# Patient Record
Sex: FEMALE | Race: WHITE | NOT HISPANIC OR LATINO | URBAN - METROPOLITAN AREA
[De-identification: names, ages, dates, MRNs, and addresses within clinical notes are randomized per-mention and may not be internally consistent; named-entity substitution may affect disease eponyms.]

---

## 2017-01-09 ENCOUNTER — OUTPATIENT (OUTPATIENT)
Dept: OUTPATIENT SERVICES | Facility: HOSPITAL | Age: 57
LOS: 1 days | Discharge: HOME | End: 2017-01-09

## 2017-01-11 DIAGNOSIS — E03.9 HYPOTHYROIDISM, UNSPECIFIED: ICD-10-CM

## 2017-01-11 DIAGNOSIS — L98.7 EXCESSIVE AND REDUNDANT SKIN AND SUBCUTANEOUS TISSUE: ICD-10-CM

## 2017-01-11 DIAGNOSIS — Z88.2 ALLERGY STATUS TO SULFONAMIDES: ICD-10-CM

## 2017-01-14 ENCOUNTER — RX RENEWAL (OUTPATIENT)
Age: 57
End: 2017-01-14

## 2017-07-03 ENCOUNTER — APPOINTMENT (OUTPATIENT)
Dept: INTERNAL MEDICINE | Facility: CLINIC | Age: 57
End: 2017-07-03

## 2018-02-23 ENCOUNTER — APPOINTMENT (OUTPATIENT)
Dept: RHEUMATOLOGY | Facility: CLINIC | Age: 58
End: 2018-02-23
Payer: COMMERCIAL

## 2018-02-23 PROCEDURE — 96374 THER/PROPH/DIAG INJ IV PUSH: CPT

## 2018-07-25 ENCOUNTER — LABORATORY RESULT (OUTPATIENT)
Age: 58
End: 2018-07-25

## 2018-07-25 ENCOUNTER — APPOINTMENT (OUTPATIENT)
Dept: INTERNAL MEDICINE | Facility: CLINIC | Age: 58
End: 2018-07-25
Payer: COMMERCIAL

## 2018-07-25 ENCOUNTER — NON-APPOINTMENT (OUTPATIENT)
Age: 58
End: 2018-07-25

## 2018-07-25 VITALS — HEIGHT: 61 IN | BODY MASS INDEX: 19.07 KG/M2 | WEIGHT: 101 LBS

## 2018-07-25 VITALS — WEIGHT: 101 LBS | BODY MASS INDEX: 19.07 KG/M2 | HEIGHT: 61 IN

## 2018-07-25 VITALS — SYSTOLIC BLOOD PRESSURE: 120 MMHG | DIASTOLIC BLOOD PRESSURE: 70 MMHG

## 2018-07-25 VITALS — SYSTOLIC BLOOD PRESSURE: 120 MMHG | DIASTOLIC BLOOD PRESSURE: 82 MMHG

## 2018-07-25 DIAGNOSIS — R94.5 ABNORMAL RESULTS OF LIVER FUNCTION STUDIES: ICD-10-CM

## 2018-07-25 LAB
BILIRUB UR QL STRIP: NORMAL
CLARITY UR: CLEAR
COLLECTION METHOD: NORMAL
GLUCOSE UR-MCNC: NORMAL
HCG UR QL: 0.2 EU/DL
HGB UR QL STRIP.AUTO: NORMAL
KETONES UR-MCNC: NORMAL
LEUKOCYTE ESTERASE UR QL STRIP: NORMAL
NITRITE UR QL STRIP: NORMAL
PH UR STRIP: 7.5
PROT UR STRIP-MCNC: NORMAL
SP GR UR STRIP: 1.01

## 2018-07-25 PROCEDURE — 99386 PREV VISIT NEW AGE 40-64: CPT | Mod: 25

## 2018-07-25 PROCEDURE — 36415 COLL VENOUS BLD VENIPUNCTURE: CPT

## 2018-07-25 PROCEDURE — 81003 URINALYSIS AUTO W/O SCOPE: CPT | Mod: QW

## 2018-07-25 PROCEDURE — 93000 ELECTROCARDIOGRAM COMPLETE: CPT

## 2018-07-25 NOTE — HEALTH RISK ASSESSMENT
[Very Good] : ~his/her~  mood as very good [No falls in past year] : Patient reported no falls in the past year [0] : 2) Feeling down, depressed, or hopeless: Not at all (0) [None] : None [With Significant Other] : lives with significant other [Employed] : employed [College] : College [] :  [Sexually Active] : sexually active [Feels Safe at Home] : Feels safe at home [Fully functional (bathing, dressing, toileting, transferring, walking, feeding)] : Fully functional (bathing, dressing, toileting, transferring, walking, feeding) [Fully functional (using the telephone, shopping, preparing meals, housekeeping, doing laundry, using] : Fully functional and needs no help or supervision to perform IADLs (using the telephone, shopping, preparing meals, housekeeping, doing laundry, using transportation, managing medications and managing finances) [Smoke Detector] : smoke detector [Carbon Monoxide Detector] : carbon monoxide detector [Seat Belt] :  uses seat belt [Sunscreen] : uses sunscreen [] : No [Change in mental status noted] : No change in mental status noted [Language] : denies difficulty with language [Behavior] : denies difficulty with behavior [Learning/Retaining New Information] : denies difficulty learning/retaining new information [Handling Complex Tasks] : denies difficulty handling complex tasks [Spatial Ability and Orientation] : denies difficulty with spatial ability and orientation [Reports changes in hearing] : Reports no changes in hearing [Reports changes in vision] : Reports no changes in vision [Reports changes in dental health] : Reports no changes in dental health [Guns at Home] : no guns at home [Travel to Developing Areas] : does not  travel to developing areas [TB Exposure] : is not being exposed to tuberculosis [MammogramDate] : 01/2016

## 2018-07-25 NOTE — PHYSICAL EXAM

## 2018-07-25 NOTE — HISTORY OF PRESENT ILLNESS
[FreeTextEntry1] : This is a 57-year-old female for annual health assessment [de-identified] : Specifically we will address her history of Behcet's osteoporosis hypothyroidism

## 2018-07-25 NOTE — ASSESSMENT
[FreeTextEntry1] : This is a 57-year-old\par \par Patient has a history of hypothyroidism she is maintained on Synthroid a TSH has been obtained\par \par She has a history of osteoporosis she is followed by endocrinology and has received intravenous therapy she is due for bone density\par \par She has a distant history of the shunt this was diagnosed clinically of course she has no manifestations at this time\par \par She follows up appropriately for mammography OB/GYN colonoscopy\par \par I have recommended that she take the new shingle shot

## 2018-07-31 ENCOUNTER — OTHER (OUTPATIENT)
Age: 58
End: 2018-07-31

## 2018-08-01 LAB
25(OH)D3 SERPL-MCNC: 62.4 NG/ML
ALBUMIN SERPL ELPH-MCNC: 4.3 G/DL
ALP BLD-CCNC: 70 U/L
ALT SERPL-CCNC: 53 U/L
ANION GAP SERPL CALC-SCNC: 17 MMOL/L
AST SERPL-CCNC: 39 U/L
BASOPHILS # BLD AUTO: 0.02 K/UL
BASOPHILS NFR BLD AUTO: 0.3 %
BILIRUB SERPL-MCNC: 0.4 MG/DL
BUN SERPL-MCNC: 18 MG/DL
CALCIUM SERPL-MCNC: 10 MG/DL
CHLORIDE SERPL-SCNC: 98 MMOL/L
CHOLEST SERPL-MCNC: 161 MG/DL
CHOLEST/HDLC SERPL: 2.3 RATIO
CO2 SERPL-SCNC: 21 MMOL/L
CREAT SERPL-MCNC: 0.45 MG/DL
EOSINOPHIL # BLD AUTO: 0.12 K/UL
EOSINOPHIL NFR BLD AUTO: 1.5 %
GLUCOSE SERPL-MCNC: 114 MG/DL
HBA1C MFR BLD HPLC: 5.5 %
HBV SURFACE AB SER QL: NONREACTIVE
HBV SURFACE AG SER QL: NONREACTIVE
HCT VFR BLD CALC: 42.9 %
HCV AB SER QL: NONREACTIVE
HCV S/CO RATIO: 0.39 S/CO
HDLC SERPL-MCNC: 70 MG/DL
HGB BLD-MCNC: 13.9 G/DL
IMM GRANULOCYTES NFR BLD AUTO: 0.1 %
IRON SATN MFR SERPL: 34 %
IRON SERPL-MCNC: 107 UG/DL
LDLC SERPL CALC-MCNC: 65 MG/DL
LYMPHOCYTES # BLD AUTO: 3.22 K/UL
LYMPHOCYTES NFR BLD AUTO: 41.2 %
MAN DIFF?: NORMAL
MCHC RBC-ENTMCNC: 28 PG
MCHC RBC-ENTMCNC: 32.4 GM/DL
MCV RBC AUTO: 86.3 FL
MONOCYTES # BLD AUTO: 0.6 K/UL
MONOCYTES NFR BLD AUTO: 7.7 %
NEUTROPHILS # BLD AUTO: 3.84 K/UL
NEUTROPHILS NFR BLD AUTO: 49.2 %
PLATELET # BLD AUTO: 229 K/UL
POTASSIUM SERPL-SCNC: 4.5 MMOL/L
PROT SERPL-MCNC: 7.5 G/DL
RBC # BLD: 4.97 M/UL
RBC # FLD: 13.9 %
SAVE SPECIMEN: NORMAL
SODIUM SERPL-SCNC: 136 MMOL/L
T3RU NFR SERPL: 0.89 INDEX
T4 SERPL-MCNC: 7.4 UG/DL
TIBC SERPL-MCNC: 317 UG/DL
TRIGL SERPL-MCNC: 130 MG/DL
TSH SERPL-ACNC: 0.01 UIU/ML
UIBC SERPL-MCNC: 210 UG/DL
URATE SERPL-MCNC: 6.5 MG/DL
WBC # FLD AUTO: 7.81 K/UL

## 2019-01-19 ENCOUNTER — TRANSCRIPTION ENCOUNTER (OUTPATIENT)
Age: 59
End: 2019-01-19

## 2019-02-27 ENCOUNTER — APPOINTMENT (OUTPATIENT)
Dept: RHEUMATOLOGY | Facility: CLINIC | Age: 59
End: 2019-02-27
Payer: COMMERCIAL

## 2019-02-27 PROCEDURE — 96374 THER/PROPH/DIAG INJ IV PUSH: CPT

## 2019-10-18 ENCOUNTER — TRANSCRIPTION ENCOUNTER (OUTPATIENT)
Age: 59
End: 2019-10-18

## 2019-10-25 ENCOUNTER — APPOINTMENT (OUTPATIENT)
Dept: INTERNAL MEDICINE | Facility: CLINIC | Age: 59
End: 2019-10-25
Payer: COMMERCIAL

## 2019-10-25 VITALS — WEIGHT: 101 LBS | BODY MASS INDEX: 19.07 KG/M2 | HEIGHT: 61 IN

## 2019-10-25 DIAGNOSIS — L02.91 CUTANEOUS ABSCESS, UNSPECIFIED: ICD-10-CM

## 2019-10-25 DIAGNOSIS — Z00.00 ENCOUNTER FOR GENERAL ADULT MEDICAL EXAMINATION W/OUT ABNORMAL FINDINGS: ICD-10-CM

## 2019-10-25 PROCEDURE — 99213 OFFICE O/P EST LOW 20 MIN: CPT

## 2019-10-25 NOTE — PHYSICAL EXAM
[No Lymphadenopathy] : no lymphadenopathy [Supple] : supple [Normal] : no jugular venous distention, supple, no lymphadenopathy and the thyroid was normal and there were no nodules present [de-identified] : very small pustule approximately 1/4 cm minimally tender and draining

## 2019-10-25 NOTE — ASSESSMENT
[FreeTextEntry1] : This is a 59-year-old female who comes in for history which was reviewed above\par \par Her pustule has been draining for 4 days she did not come in because she was waiting for the culture. She has no systemic symptoms he has minimal tenderness only on pressure of this area\par \par She was asked to call her plastic surgeon this afternoon. She should make an appointment as soon as possible however I do not believe that this is significant she will continue with her current regime of  compresses and bacitracin. I told her that if there is any evidence of spread or increase or fevers to call me. In addition I told her if her plastic surgeon had any suggestions this afternoon I be more than happy to listen

## 2019-10-25 NOTE — HISTORY OF PRESENT ILLNESS
[FreeTextEntry8] : This is a 59-year-old female who has had a bout of MRSA in the past\par \par .Approximately a month ago she had some plastic surgery unfortunately this keloids . In addition she had some suture dehiscence she was seen by plastics and noted to have an infection. No antibiotics were given\par \par She did develop a keloid she saw her plastic surgeon who started giving her steroid injections approximately one week ago this became erythematous and now she has a small area of pus \par \par This has been oozing for approximately 4 days she went for a culture the culture came back positive for staph resistant to penicillin but sensitive to oxacillin and ampicillin she is allergic to ampicillin she feels that it is getting better

## 2019-10-25 NOTE — PHYSICAL EXAM
[No Lymphadenopathy] : no lymphadenopathy [Normal] : no jugular venous distention, supple, no lymphadenopathy and the thyroid was normal and there were no nodules present [Supple] : supple [de-identified] : very small pustule approximately 1/4 cm minimally tender and draining

## 2019-12-20 ENCOUNTER — APPOINTMENT (OUTPATIENT)
Dept: INTERNAL MEDICINE | Facility: CLINIC | Age: 59
End: 2019-12-20

## 2020-02-19 ENCOUNTER — APPOINTMENT (OUTPATIENT)
Dept: INTERNAL MEDICINE | Facility: CLINIC | Age: 60
End: 2020-02-19

## 2020-02-26 ENCOUNTER — APPOINTMENT (OUTPATIENT)
Dept: INTERNAL MEDICINE | Facility: CLINIC | Age: 60
End: 2020-02-26
Payer: COMMERCIAL

## 2020-02-26 PROCEDURE — 90471 IMMUNIZATION ADMIN: CPT

## 2020-02-26 PROCEDURE — 90750 HZV VACC RECOMBINANT IM: CPT

## 2020-03-20 ENCOUNTER — APPOINTMENT (OUTPATIENT)
Dept: RHEUMATOLOGY | Facility: CLINIC | Age: 60
End: 2020-03-20
Payer: COMMERCIAL

## 2020-03-20 PROCEDURE — 96374 THER/PROPH/DIAG INJ IV PUSH: CPT

## 2020-06-02 DIAGNOSIS — Z11.59 ENCOUNTER FOR SCREENING FOR OTHER VIRAL DISEASES: ICD-10-CM

## 2020-06-03 LAB
SARS-COV-2 IGG SERPL IA-ACNC: <0.1 INDEX
SARS-COV-2 IGG SERPL QL IA: NEGATIVE

## 2020-06-12 DIAGNOSIS — R92.2 INCONCLUSIVE MAMMOGRAM: ICD-10-CM

## 2020-06-12 DIAGNOSIS — Z12.31 ENCOUNTER FOR SCREENING MAMMOGRAM FOR MALIGNANT NEOPLASM OF BREAST: ICD-10-CM

## 2020-07-08 ENCOUNTER — APPOINTMENT (OUTPATIENT)
Dept: INTERNAL MEDICINE | Facility: CLINIC | Age: 60
End: 2020-07-08
Payer: COMMERCIAL

## 2020-07-08 DIAGNOSIS — Z23 ENCOUNTER FOR IMMUNIZATION: ICD-10-CM

## 2020-07-08 PROCEDURE — 90471 IMMUNIZATION ADMIN: CPT

## 2020-07-08 PROCEDURE — 90750 HZV VACC RECOMBINANT IM: CPT

## 2021-03-29 ENCOUNTER — APPOINTMENT (OUTPATIENT)
Dept: RHEUMATOLOGY | Facility: CLINIC | Age: 61
End: 2021-03-29
Payer: COMMERCIAL

## 2021-03-29 PROCEDURE — 99072 ADDL SUPL MATRL&STAF TM PHE: CPT

## 2021-03-29 PROCEDURE — 96374 THER/PROPH/DIAG INJ IV PUSH: CPT

## 2021-10-10 ENCOUNTER — TRANSCRIPTION ENCOUNTER (OUTPATIENT)
Age: 61
End: 2021-10-10

## 2021-10-20 ENCOUNTER — INPATIENT (INPATIENT)
Facility: HOSPITAL | Age: 61
LOS: 0 days | Discharge: HOME | DRG: 274 | End: 2021-10-21
Attending: INTERNAL MEDICINE | Admitting: INTERNAL MEDICINE
Payer: COMMERCIAL

## 2021-10-20 ENCOUNTER — APPOINTMENT (OUTPATIENT)
Dept: INTERNAL MEDICINE | Facility: CLINIC | Age: 61
End: 2021-10-20
Payer: COMMERCIAL

## 2021-10-20 ENCOUNTER — FORM ENCOUNTER (OUTPATIENT)
Age: 61
End: 2021-10-20

## 2021-10-20 ENCOUNTER — NON-APPOINTMENT (OUTPATIENT)
Age: 61
End: 2021-10-20

## 2021-10-20 VITALS — OXYGEN SATURATION: 97 % | TEMPERATURE: 97.7 F

## 2021-10-20 VITALS
OXYGEN SATURATION: 95 % | RESPIRATION RATE: 17 BRPM | DIASTOLIC BLOOD PRESSURE: 108 MMHG | HEART RATE: 157 BPM | TEMPERATURE: 98 F | SYSTOLIC BLOOD PRESSURE: 155 MMHG

## 2021-10-20 VITALS
SYSTOLIC BLOOD PRESSURE: 120 MMHG | DIASTOLIC BLOOD PRESSURE: 70 MMHG | BODY MASS INDEX: 19.07 KG/M2 | WEIGHT: 101 LBS | HEIGHT: 61 IN

## 2021-10-20 DIAGNOSIS — I48.91 UNSPECIFIED ATRIAL FIBRILLATION: ICD-10-CM

## 2021-10-20 LAB
ALBUMIN SERPL ELPH-MCNC: 3.7 G/DL — SIGNIFICANT CHANGE UP (ref 3.3–5)
ALBUMIN SERPL ELPH-MCNC: 4.2 G/DL — SIGNIFICANT CHANGE UP (ref 3.3–5)
ALP SERPL-CCNC: 68 U/L — SIGNIFICANT CHANGE UP (ref 40–120)
ALP SERPL-CCNC: 73 U/L — SIGNIFICANT CHANGE UP (ref 40–120)
ALT FLD-CCNC: 32 U/L — SIGNIFICANT CHANGE UP (ref 10–45)
ALT FLD-CCNC: 38 U/L — SIGNIFICANT CHANGE UP (ref 10–45)
ANION GAP SERPL CALC-SCNC: 14 MMOL/L — SIGNIFICANT CHANGE UP (ref 5–17)
ANION GAP SERPL CALC-SCNC: 16 MMOL/L — SIGNIFICANT CHANGE UP (ref 5–17)
APTT BLD: 30.1 SEC — SIGNIFICANT CHANGE UP (ref 27.5–35.5)
APTT BLD: 80.8 SEC — HIGH (ref 27.5–35.5)
AST SERPL-CCNC: 23 U/L — SIGNIFICANT CHANGE UP (ref 10–40)
AST SERPL-CCNC: 28 U/L — SIGNIFICANT CHANGE UP (ref 10–40)
BASOPHILS # BLD AUTO: 0.02 K/UL — SIGNIFICANT CHANGE UP (ref 0–0.2)
BASOPHILS NFR BLD AUTO: 0.3 % — SIGNIFICANT CHANGE UP (ref 0–2)
BILIRUB SERPL-MCNC: 0.3 MG/DL — SIGNIFICANT CHANGE UP (ref 0.2–1.2)
BILIRUB SERPL-MCNC: 0.3 MG/DL — SIGNIFICANT CHANGE UP (ref 0.2–1.2)
BUN SERPL-MCNC: 10 MG/DL — SIGNIFICANT CHANGE UP (ref 7–23)
BUN SERPL-MCNC: 12 MG/DL — SIGNIFICANT CHANGE UP (ref 7–23)
CALCIUM SERPL-MCNC: 8.9 MG/DL — SIGNIFICANT CHANGE UP (ref 8.4–10.5)
CALCIUM SERPL-MCNC: 9.6 MG/DL — SIGNIFICANT CHANGE UP (ref 8.4–10.5)
CHLORIDE SERPL-SCNC: 104 MMOL/L — SIGNIFICANT CHANGE UP (ref 96–108)
CHLORIDE SERPL-SCNC: 108 MMOL/L — SIGNIFICANT CHANGE UP (ref 96–108)
CO2 SERPL-SCNC: 17 MMOL/L — LOW (ref 22–31)
CO2 SERPL-SCNC: 17 MMOL/L — LOW (ref 22–31)
CREAT SERPL-MCNC: 0.53 MG/DL — SIGNIFICANT CHANGE UP (ref 0.5–1.3)
CREAT SERPL-MCNC: <0.3 MG/DL — LOW (ref 0.5–1.3)
D DIMER BLD IA.RAPID-MCNC: <150 NG/ML DDU — SIGNIFICANT CHANGE UP
EOSINOPHIL # BLD AUTO: 0.04 K/UL — SIGNIFICANT CHANGE UP (ref 0–0.5)
EOSINOPHIL NFR BLD AUTO: 0.6 % — SIGNIFICANT CHANGE UP (ref 0–6)
ETHANOL SERPL-MCNC: SIGNIFICANT CHANGE UP MG/DL (ref 0–10)
GLUCOSE SERPL-MCNC: 106 MG/DL — HIGH (ref 70–99)
GLUCOSE SERPL-MCNC: 108 MG/DL — HIGH (ref 70–99)
HCT VFR BLD CALC: 41.7 % — SIGNIFICANT CHANGE UP (ref 34.5–45)
HGB BLD-MCNC: 13.6 G/DL — SIGNIFICANT CHANGE UP (ref 11.5–15.5)
IMM GRANULOCYTES NFR BLD AUTO: 0.3 % — SIGNIFICANT CHANGE UP (ref 0–1.5)
INR BLD: 0.9 RATIO — SIGNIFICANT CHANGE UP (ref 0.88–1.16)
INR BLD: 0.91 RATIO — SIGNIFICANT CHANGE UP (ref 0.88–1.16)
LACTATE SERPL-SCNC: 1.6 MMOL/L — SIGNIFICANT CHANGE UP (ref 0.7–2)
LYMPHOCYTES # BLD AUTO: 2.14 K/UL — SIGNIFICANT CHANGE UP (ref 1–3.3)
LYMPHOCYTES # BLD AUTO: 29.6 % — SIGNIFICANT CHANGE UP (ref 13–44)
MAGNESIUM SERPL-MCNC: 1.9 MG/DL — SIGNIFICANT CHANGE UP (ref 1.6–2.6)
MAGNESIUM SERPL-MCNC: 1.9 MG/DL — SIGNIFICANT CHANGE UP (ref 1.6–2.6)
MCHC RBC-ENTMCNC: 27.3 PG — SIGNIFICANT CHANGE UP (ref 27–34)
MCHC RBC-ENTMCNC: 32.6 GM/DL — SIGNIFICANT CHANGE UP (ref 32–36)
MCV RBC AUTO: 83.6 FL — SIGNIFICANT CHANGE UP (ref 80–100)
MONOCYTES # BLD AUTO: 0.9 K/UL — SIGNIFICANT CHANGE UP (ref 0–0.9)
MONOCYTES NFR BLD AUTO: 12.5 % — SIGNIFICANT CHANGE UP (ref 2–14)
NEUTROPHILS # BLD AUTO: 4.1 K/UL — SIGNIFICANT CHANGE UP (ref 1.8–7.4)
NEUTROPHILS NFR BLD AUTO: 56.7 % — SIGNIFICANT CHANGE UP (ref 43–77)
NRBC # BLD: 0 /100 WBCS — SIGNIFICANT CHANGE UP (ref 0–0)
NT-PROBNP SERPL-SCNC: 815 PG/ML — HIGH (ref 0–300)
PHOSPHATE SERPL-MCNC: 1.9 MG/DL — LOW (ref 2.5–4.5)
PLATELET # BLD AUTO: 175 K/UL — SIGNIFICANT CHANGE UP (ref 150–400)
POTASSIUM SERPL-MCNC: 4.1 MMOL/L — SIGNIFICANT CHANGE UP (ref 3.5–5.3)
POTASSIUM SERPL-MCNC: 4.3 MMOL/L — SIGNIFICANT CHANGE UP (ref 3.5–5.3)
POTASSIUM SERPL-SCNC: 4.1 MMOL/L — SIGNIFICANT CHANGE UP (ref 3.5–5.3)
POTASSIUM SERPL-SCNC: 4.3 MMOL/L — SIGNIFICANT CHANGE UP (ref 3.5–5.3)
PROT SERPL-MCNC: 6.8 G/DL — SIGNIFICANT CHANGE UP (ref 6–8.3)
PROT SERPL-MCNC: 7.3 G/DL — SIGNIFICANT CHANGE UP (ref 6–8.3)
PROTHROM AB SERPL-ACNC: 10.9 SEC — SIGNIFICANT CHANGE UP (ref 10.6–13.6)
PROTHROM AB SERPL-ACNC: 11 SEC — SIGNIFICANT CHANGE UP (ref 10.6–13.6)
RAPID RVP RESULT: SIGNIFICANT CHANGE UP
RBC # BLD: 4.99 M/UL — SIGNIFICANT CHANGE UP (ref 3.8–5.2)
RBC # FLD: 12.7 % — SIGNIFICANT CHANGE UP (ref 10.3–14.5)
SARS-COV-2 RNA SPEC QL NAA+PROBE: SIGNIFICANT CHANGE UP
SODIUM SERPL-SCNC: 137 MMOL/L — SIGNIFICANT CHANGE UP (ref 135–145)
SODIUM SERPL-SCNC: 139 MMOL/L — SIGNIFICANT CHANGE UP (ref 135–145)
TROPONIN T, HIGH SENSITIVITY RESULT: <6 NG/L — SIGNIFICANT CHANGE UP (ref 0–51)
WBC # BLD: 7.22 K/UL — SIGNIFICANT CHANGE UP (ref 3.8–10.5)
WBC # FLD AUTO: 7.22 K/UL — SIGNIFICANT CHANGE UP (ref 3.8–10.5)

## 2021-10-20 PROCEDURE — 71046 X-RAY EXAM CHEST 2 VIEWS: CPT | Mod: 26

## 2021-10-20 PROCEDURE — 93000 ELECTROCARDIOGRAM COMPLETE: CPT

## 2021-10-20 PROCEDURE — 99221 1ST HOSP IP/OBS SF/LOW 40: CPT

## 2021-10-20 PROCEDURE — 99291 CRITICAL CARE FIRST HOUR: CPT | Mod: 25

## 2021-10-20 PROCEDURE — 93010 ELECTROCARDIOGRAM REPORT: CPT | Mod: 59

## 2021-10-20 PROCEDURE — 99214 OFFICE O/P EST MOD 30 MIN: CPT | Mod: 25

## 2021-10-20 PROCEDURE — 71275 CT ANGIOGRAPHY CHEST: CPT | Mod: 26,MA

## 2021-10-20 RX ORDER — LIOTHYRONINE SODIUM 25 UG/1
1 TABLET ORAL
Qty: 0 | Refills: 0 | DISCHARGE

## 2021-10-20 RX ORDER — FOLIC ACID 0.8 MG
1 TABLET ORAL DAILY
Refills: 0 | Status: DISCONTINUED | OUTPATIENT
Start: 2021-10-20 | End: 2021-10-21

## 2021-10-20 RX ORDER — CHOLECALCIFEROL (VITAMIN D3) 125 MCG
0 CAPSULE ORAL
Qty: 0 | Refills: 0 | DISCHARGE

## 2021-10-20 RX ORDER — CALCIUM CARBONATE 500(1250)
0 TABLET ORAL
Qty: 0 | Refills: 0 | DISCHARGE

## 2021-10-20 RX ORDER — HEPARIN SODIUM 5000 [USP'U]/ML
INJECTION INTRAVENOUS; SUBCUTANEOUS
Qty: 25000 | Refills: 0 | Status: DISCONTINUED | OUTPATIENT
Start: 2021-10-20 | End: 2021-10-20

## 2021-10-20 RX ORDER — MAGNESIUM SULFATE 500 MG/ML
1 VIAL (ML) INJECTION ONCE
Refills: 0 | Status: COMPLETED | OUTPATIENT
Start: 2021-10-20 | End: 2021-10-20

## 2021-10-20 RX ORDER — DILTIAZEM HCL 120 MG
60 CAPSULE, EXT RELEASE 24 HR ORAL ONCE
Refills: 0 | Status: DISCONTINUED | OUTPATIENT
Start: 2021-10-20 | End: 2021-10-20

## 2021-10-20 RX ORDER — DILTIAZEM HCL 120 MG
12 CAPSULE, EXT RELEASE 24 HR ORAL ONCE
Refills: 0 | Status: COMPLETED | OUTPATIENT
Start: 2021-10-20 | End: 2021-10-20

## 2021-10-20 RX ORDER — HEPARIN SODIUM 5000 [USP'U]/ML
2000 INJECTION INTRAVENOUS; SUBCUTANEOUS EVERY 6 HOURS
Refills: 0 | Status: DISCONTINUED | OUTPATIENT
Start: 2021-10-20 | End: 2021-10-20

## 2021-10-20 RX ORDER — HEPARIN SODIUM 5000 [USP'U]/ML
4000 INJECTION INTRAVENOUS; SUBCUTANEOUS ONCE
Refills: 0 | Status: COMPLETED | OUTPATIENT
Start: 2021-10-20 | End: 2021-10-20

## 2021-10-20 RX ORDER — LEVOTHYROXINE SODIUM 125 MCG
1 TABLET ORAL
Qty: 0 | Refills: 0 | DISCHARGE

## 2021-10-20 RX ORDER — HEPARIN SODIUM 5000 [USP'U]/ML
900 INJECTION INTRAVENOUS; SUBCUTANEOUS
Qty: 25000 | Refills: 0 | Status: DISCONTINUED | OUTPATIENT
Start: 2021-10-20 | End: 2021-10-21

## 2021-10-20 RX ORDER — SPIRONOLACTONE 25 MG/1
1 TABLET, FILM COATED ORAL
Qty: 0 | Refills: 0 | DISCHARGE

## 2021-10-20 RX ORDER — DILTIAZEM HCL 120 MG
10 CAPSULE, EXT RELEASE 24 HR ORAL ONCE
Refills: 0 | Status: DISCONTINUED | OUTPATIENT
Start: 2021-10-20 | End: 2021-10-20

## 2021-10-20 RX ORDER — CHLORHEXIDINE GLUCONATE 213 G/1000ML
1 SOLUTION TOPICAL DAILY
Refills: 0 | Status: DISCONTINUED | OUTPATIENT
Start: 2021-10-20 | End: 2021-10-21

## 2021-10-20 RX ORDER — SODIUM CHLORIDE 9 MG/ML
1000 INJECTION INTRAMUSCULAR; INTRAVENOUS; SUBCUTANEOUS ONCE
Refills: 0 | Status: COMPLETED | OUTPATIENT
Start: 2021-10-20 | End: 2021-10-20

## 2021-10-20 RX ORDER — FINASTERIDE 5 MG/1
1 TABLET, FILM COATED ORAL
Qty: 0 | Refills: 0 | DISCHARGE

## 2021-10-20 RX ORDER — INFLUENZA VIRUS VACCINE 15; 15; 15; 15 UG/.5ML; UG/.5ML; UG/.5ML; UG/.5ML
0.5 SUSPENSION INTRAMUSCULAR ONCE
Refills: 0 | Status: COMPLETED | OUTPATIENT
Start: 2021-10-20 | End: 2021-10-20

## 2021-10-20 RX ORDER — HEPARIN SODIUM 5000 [USP'U]/ML
4000 INJECTION INTRAVENOUS; SUBCUTANEOUS EVERY 6 HOURS
Refills: 0 | Status: DISCONTINUED | OUTPATIENT
Start: 2021-10-20 | End: 2021-10-20

## 2021-10-20 RX ORDER — THIAMINE MONONITRATE (VIT B1) 100 MG
100 TABLET ORAL DAILY
Refills: 0 | Status: DISCONTINUED | OUTPATIENT
Start: 2021-10-20 | End: 2021-10-21

## 2021-10-20 RX ORDER — MAGNESIUM SULFATE 500 MG/ML
2 VIAL (ML) INJECTION ONCE
Refills: 0 | Status: COMPLETED | OUTPATIENT
Start: 2021-10-20 | End: 2021-10-21

## 2021-10-20 RX ORDER — ALPRAZOLAM 0.25 MG
0.25 TABLET ORAL AT BEDTIME
Refills: 0 | Status: DISCONTINUED | OUTPATIENT
Start: 2021-10-20 | End: 2021-10-21

## 2021-10-20 RX ADMIN — SODIUM CHLORIDE 1000 MILLILITER(S): 9 INJECTION INTRAMUSCULAR; INTRAVENOUS; SUBCUTANEOUS at 12:53

## 2021-10-20 RX ADMIN — Medication 1 MILLIGRAM(S): at 22:30

## 2021-10-20 RX ADMIN — Medication 1 TABLET(S): at 22:30

## 2021-10-20 RX ADMIN — Medication 100 GRAM(S): at 16:08

## 2021-10-20 RX ADMIN — SODIUM CHLORIDE 1000 MILLILITER(S): 9 INJECTION INTRAMUSCULAR; INTRAVENOUS; SUBCUTANEOUS at 14:10

## 2021-10-20 RX ADMIN — Medication 0.25 MILLIGRAM(S): at 22:30

## 2021-10-20 RX ADMIN — HEPARIN SODIUM 900 UNIT(S)/HR: 5000 INJECTION INTRAVENOUS; SUBCUTANEOUS at 18:06

## 2021-10-20 RX ADMIN — HEPARIN SODIUM 4000 UNIT(S): 5000 INJECTION INTRAVENOUS; SUBCUTANEOUS at 18:05

## 2021-10-20 RX ADMIN — Medication 12 MILLIGRAM(S): at 13:59

## 2021-10-20 RX ADMIN — Medication 100 MILLIGRAM(S): at 22:30

## 2021-10-20 NOTE — H&P ADULT - ASSESSMENT
60 year old F with PMHx of hypothyroid, Behcet's disease who presented to the ED with chief complaint of lightheadedness and palpitations, found to be in AFib with RVR with conversion pauses after receiving diltiazem, transferred to CICU now pending ablation vs PPM.     #Cardiovascular  Tachy-min syndrome - AFib RVR and Conversion Pauses  - will place TVP  - rate control with diltiazem  - scheduled for PM placement tomorrow, +/- ablation  - EP following    #Neuro  AOx3, no acute issues    #Respiratory  Hx Behcet disease  - Pulmonary CTA to eval for PE  - Sating well RA, goal SpO2 > 92%    #GI/Nutrition  - NPO after MN    #/Renal  - No acute issues  - Trend I/Os, monitor UOP    #Hem-Onc  - heparin gtt    #Endo  No acute issues     #ID  No acute issues  - Trend WBC, fevers    Disposition: CICU           60 year old F with PMHx of hypothyroid, Behcet's disease who presented to the ED with chief complaint of lightheadedness and palpitations, found to be in AFib with RVR with conversion pauses after receiving diltiazem, transferred to CICU now pending ablation vs PPM.     #Cardiovascular  Afib w/ RVR and conversion pauses/sinus pauses.   - unclear etiology: possibly from EtOH use. Will check TSH and electrolytes.  - s/p IV diltiazem in ED with cardioversion to NSR accompanied by conversion pause of 6 seconds. Having sinus pauses 4s-7s afterwards intermittently with symptoms.  - Discussed with Dr. Morgan (EP): hold off on transvenous pacemaker or rate control meds overnight.  - will monitor patient on tele in CCU.   - Transcutaneous pacer pads on patient.  - likely EPS and Afib ablation in the AM. NPO after MN, COVID, pre-op labs.  - EP following.    #Neuro  AOx3, no acute issues  Patient w/ EtOH use. Will send Utox and EtoH level.   PRN benzo.    #Respiratory  Hx Behcet disease  - Pulmonary CTA to eval for PE  - Sating well RA, goal SpO2 > 92%    #GI/Nutrition  - no active issue.  - NPO after MN    #/Renal  - No acute issues  - Trend I/Os, monitor UOP    #Hem-Onc  - heparin gtt    #Endo  No acute issues     #ID  No acute issues  - Trend WBC, fevers    Disposition: CICU           60 year old F with PMHx of hypothyroid, Behcet's disease who presented to the ED with chief complaint of lightheadedness and palpitations, found to be in AFib with RVR with conversion pauses after receiving diltiazem, transferred to CICU now pending ablation vs PPM.     #Cardiovascular  Afib w/ RVR and conversion pauses/sinus pauses.   - unclear etiology: possibly from EtOH use. Will check TSH and electrolytes.  - s/p IV diltiazem in ED with cardioversion to NSR accompanied by conversion pause of 6 seconds. Having sinus pauses 4s-7s afterwards intermittently with symptoms.  - Discussed with Dr. Morgan (EP): hold off on transvenous pacemaker or rate control meds overnight.  - will monitor patient on tele in CCU.   - gentle mIVF.  - Transcutaneous pacer pads on patient.  - likely EPS and Afib ablation in the AM. NPO after MN, COVID, pre-op labs.  - EP following.    #Neuro  AOx3, no acute issues  Patient w/ EtOH use. Will send Utox and EtoH level.   Folic acid, thiamine, MVI.  PRN benzo.    #Respiratory  Hx Behcet disease  - Pulmonary CTA to eval for PE  - Sating well RA, goal SpO2 > 92%    #GI/Nutrition  - no active issue.  - NPO after MN    #/Renal  - No acute issues  - Trend I/Os, monitor UOP    #Hem-Onc  - heparin gtt    #Endo  No acute issues     #ID  No acute issues  - Trend WBC, fevers    Disposition: CICU

## 2021-10-20 NOTE — PHYSICAL EXAM
[No JVD] : no jugular venous distention [Normal] : no joint swelling and grossly normal strength and tone [de-identified] : Irregularly irregular

## 2021-10-20 NOTE — H&P ADULT - NSHPPHYSICALEXAM_GEN_ALL_CORE
Gen: NAD; well appearing  Head: NCAT  Eyes: EOMI, PERRLA, no conjunctival pallor, no scleral icterus  ENT: mucous membranes moist, no discharge  Neck: neck supple  Resp: CTAB, no W/R/R  CV: tachycardic, +S1/S2, no M/R/G  GI: Abdomen soft non-distended, NTTP, no masses  MSK: No open wounds, no bruising, no lower extremity edema  Neuro: A&Ox4, sensation nl, motor 5/5 RUE/LUE/RLE/LLE, follows commands  Ext: no edema, no deformity, warm and well-perfused  Skin: no rash or bruising

## 2021-10-20 NOTE — H&P ADULT - NSHPLABSRESULTS_GEN_ALL_CORE
LABS:                          13.6   7.22  )-----------( 175      ( 20 Oct 2021 12:42 )             41.7     10-20    137  |  104  |  10  ----------------------------<  108<H>  4.3   |  17<L>  |  <0.30<L>    Ca    9.6      20 Oct 2021 12:42  Mg     1.9     10-20    TPro  7.3  /  Alb  4.2  /  TBili  0.3  /  DBili  x   /  AST  28  /  ALT  38  /  AlkPhos  73  10-20    PT/INR - ( 20 Oct 2021 12:42 )   PT: 10.9 sec;   INR: 0.90 ratio         PTT - ( 20 Oct 2021 12:42 )  PTT:30.1 sec

## 2021-10-20 NOTE — ED PROVIDER NOTE - OBJECTIVE STATEMENT
61 yo female with PMHx of hypothyroid, Behcet's disease p/w lightheadedness and palpitations.  Patient reports that she woke up this morning feeling well.  A few hours later she developed lightheadedness and 2 episodes of dizziness associated with palpitations.  Patient was seen by her PMD and found to be in afib w/ rvr and sent to the ER.  Patient reports that she had an episode of afib 1-2 yrs ago.  Initially started on Metoprolol and Eliquis, but that was stopped 2 weeks later by her cardiologist in [facility] after spontaneously converting to NSR.  Afib at that time was believed to be cause by a recent viral infection.  Denies Cp, SOB, abd pain, NVD, dysuria.

## 2021-10-20 NOTE — PATIENT PROFILE ADULT - PUBLIC BENEFITS
Refill request received for     lovastatin (MEVACOR) 40 MG tablet  Last office visit: 1/13/2020  Next office visit: Visit date not found  Last refill: 1.15.2020  Last labs: 12.27.2019, recheck 6/27/2020    [place] Patient asking provider without labs.   Reminded patient  is relocating to [place] mid August 2020 and she will need to establish with a new PCP. Patient verbalized understanding.          no

## 2021-10-20 NOTE — H&P ADULT - NSHPREVIEWOFSYSTEMS_GEN_ALL_CORE
GENERAL:  No fever or chills  EYES: No change in vision  HEENT: No trouble swallowing or speaking  CARDIAC: +palpitations, No chest pain  PULMONARY: No cough or SOB  GI: No abdominal pain, no nausea or no vomiting, no diarrhea or constipation  : No changes in urination  SKIN: No rashes  NEURO: +lightheaded, No headache, no numbness  MSK: No joint pain    Otherwise as HPI or negative.

## 2021-10-20 NOTE — ED PROVIDER NOTE - ENMT NEGATIVE STATEMENT, MLM
--------------- APPROVED REPORT --------------





EXAM: Two-dimensional and M-mode echocardiogram with Doppler and 

color Doppler.



INDICATION

CVA/TIA Syncope 



2D DIMENSIONS 

Left Atrium (2D)4.7   (1.6-4.0cm)IVSd1.3   (0.7-1.1cm)

LVDd4.9   (3.9-5.9cm)PWd1.2   (0.7-1.1cm)

LVDs3.8   (2.5-4.0cm)FS (%) 24.1   %

LVEF (%)47.8   (>50%)



M-Mode DIMENSIONS 

Aortic Root2.70   (2.2-3.7cm)Aortic Cusp Exc.1.70   (1.5-2.0cm)



Aortic Valve

AoV Peak Zpbqyyon703.0cm/[place] Peak GR.7mmHg



Mitral Valve

E/A ratio0.0



TDI

E/Lateral E'0.0E/Medial E'0.0



Tricuspid Valve

TR Peak [id number]/sRAP [id number] Peak Gr.25mmHg

[id number]



 LEFT VENTRICLE 

The left ventricle is normal size. There is mild concentric left 

ventricular hypertrophy. The systolic function is mildly impaired. 

There is global hypokinesis of the left ventricle.



 RIGHT VENTRICLE 

The right ventricle is normal size. There is normal right ventricular 

wall thickness. The right ventricular systolic function is normal.



 ATRIA 

The left atrium is severely dilated. The right atrium is mildly 

dilated.



 AORTIC VALVE 

The aortic valve is not well visualized and it may be bicusped There 

is trace aortic regurgitation.



 MITRAL VALVE 

The mitral valve is mildly thickened. Mitral regurgitation is mild.



 TRICUSPID VALVE 

There is mild pulmonary hypertension.



 GREAT VESSELS 

The aortic root is normal in size.



<Conclusion>

The left ventricle is normal size.

There is mild concentric left ventricular hypertrophy.

The systolic function is mildly impaired.

There is global hypokinesis of the left ventricle.

The left atrium is severely dilated.

The aortic valve is not well visualized and it may be bicusped

There is mild pulmonary hypertension.

Mitral regurgitation is mild. Ears: no ear pain and no hearing problems. Nose: no nasal congestion and no nasal drainage. Mouth/Throat: no dysphagia, no hoarseness and no throat pain. Neck: no lumps, no pain, no stiffness and no swollen glands.

## 2021-10-20 NOTE — ED PROVIDER NOTE - PROGRESS NOTE DETAILS
s/p 12 mg of diltiazem HR in the 120s-130s. phone call from Dr. Smith, recommending abrudescu for admissions Patient with a 4 and 6 second pause on tele without conversion.  EP consulted.  Will see patient.  -Polo Sweet PA-C HARI Mcwilliams, Attending: pt now with 4-5 documented pauses ranging from 4-6 seconds. Otherwise in afib in 140s-150s. BP reassuring. Had single dose of dilt but given pauses will appreciate EP recs for further arrythmia mgmt. Mag ordered. Tele monitoring in place. EP was called. Will see shortly. HARI Mcwilliams, Attending: accepted by CCU for likely PPM. CTA on way up.

## 2021-10-20 NOTE — CONSULT NOTE ADULT - ASSESSMENT
60 year old F with PMHx of hypothyroid, Behcet's disease, COVID-19 presents to the ED with chief complaint of lightheadedness and palpitations. She woke up in the morning feeling well, then developed lightheadedness shortly after. She endorses two episodes of dizziness associated with palpitations, which prompted her visit to see her PCP. Found to be in AFib with RVR and was sent to the ER.  Of note, the patient had a similar episode of Afib 1-2 years go after having COVID. She was started on Metoprolol and Eliquis which was d/c'ed 2 weeks later after self-conversion to NSR. Denies CP, SOB, abd pain, NVD, dysuria. In ED, given 12mg diltiazem IV with improvement in rates to 120-130s. Shortly after, noted to have 4-5 pauses on telemetry each with a duration of 4-6 seconds. EP consulted for management.     1. AFib w/ RVR  2. Pauses    - Monitor on telemetry  - Obtain CICU consult for TVP placement  - Once TVP placed, begin rate control strategy with beta blocker, uptitrate as BP/HR can tolerate.   - Would recommend starting Anticoagulation for AC.  - TSH and TTE ordered, will follow-up.   - Keep K>4.0 and Mg>2.0, replete as necessary.  - Discussed with ED team and EP attending.       ULYSSES Garza PA-C  [phone] 60 year old F with PMHx of hypothyroid, Behcet's disease, COVID-19 presents to the ED with chief complaint of lightheadedness and palpitations. She woke up in the morning feeling well, then developed lightheadedness shortly after. She endorses two episodes of dizziness associated with palpitations, which prompted her visit to see her PCP. Found to be in AFib with RVR and was sent to the ER.  Of note, the patient had a similar episode of Afib 1-2 years go after having COVID. She was started on Metoprolol and Eliquis which was d/c'ed 2 weeks later after self-conversion to NSR. Denies CP, SOB, abd pain, NVD, dysuria. In ED, given 12mg diltiazem IV with improvement in rates to 120-130s. Shortly after, noted to have 4-5 pauses on telemetry each with a duration of 4-6 seconds. EP consulted for management.     1. AFib w/ RVR  2. Pauses    - Monitor on telemetry  - Obtain CICU consult for TVP placement  - Once TVP placed, begin rate control strategy with beta blocker, uptitrate as BP/HR can tolerate.   - Would recommend starting Anticoagulation for AFib.  - TSH and TTE ordered, will follow-up.   - Keep K>4.0 and Mg>2.0, replete as necessary.  - Discussed with ED team and EP attending.       ULYSSES Garza PA-C  [phone]

## 2021-10-20 NOTE — CONSULT NOTE ADULT - ASSESSMENT
60 f with    A Fib with RVR and pauses/ Tachy Silviano syndrome  - telemetry  - TVP  - possible ablation   - possible PPM  - AC with Heparin  - cardiology evaluation  - EP evaluation    Hypothyroid  - follow TSH    Behcet disese  - stable    Anxiety control    Further action as per clinical course     d/w patient and  at bedside    Jose Solorzano MD pager [phone]

## 2021-10-20 NOTE — ED PROVIDER NOTE - PHYSICAL EXAMINATION
Gen: AAO x 3, NAD  Skin: No rashes or lesions  HEENT: NC/AT, PERRLA, EOMI, MMM  Resp: unlabored CTAB  Cardiac: tachycardic s1s2, no murmurs, rubs or gallops  GI: ND, +BS, Soft, NT  Ext: no pedal edema, FROM in all extremities  Neuro: no focal deficits

## 2021-10-20 NOTE — ED ADULT NURSE NOTE - OBJECTIVE STATEMENT
Pt is a 60 year old female sent by cardiologist for rapid a fib.  Pt is on blood thinners and had one episode in the past.  Pt is alert and oriented x 3.   at bedside.  Pt on cardiac monitor.  rapid afib noted on EKG and monitor.  PT denies SOB or chest pain.  Pt lined and labs sent.

## 2021-10-20 NOTE — ED ADULT NURSE REASSESSMENT NOTE - NS ED NURSE REASSESS COMMENT FT1
Patient has 4 second pause as per [facility] room. Dr Mcwilliams made aware. Patient is on cardiac monitor and Pads at this time. No further action to be taken at this time.
pt eating on initial encounter, pt educated on NPO for TVP. md haverty aware
report received from ENDER aguilera. pt resting in stretcher. pads in place on cardiac monitor. pt denies chest pain, shortness of breath. pt reports feeling dizzy during pauses. pending CCU bed. heparin drip in progress
At pt bedside, pt had 6.4 second pause, 4.22 second pause, and another 4.11 pause.  Pt placed on pads, Attending notified.  Awaiting Cards consult

## 2021-10-20 NOTE — ED PROVIDER NOTE - ATTENDING CONTRIBUTION TO CARE
60 F w/ hx of bechets disease, and hypothyroidism presents to the ER w/ palpitations, pt w/ hx of afib s/p covid diagnosis, pt states she was previously on eliquis. Pt states that she was taken off of medications. She has no cp, no sob, no nausea no vomiting. Pt reports her levothyroxine dosage was reduced over the past 2 weeks. Pt states she went to see her PCP today due to palpitations, and mild nausea. She has no fevers, no chills, no sob. She drinks alcohol daily. She is nontoxic appearing. On exam,   Const: appearing stated age, mild distress, appears anxious Eyes: no conjunctival injection and no scleral icterus ENMT: Atraumatic external nose and ears, Moist mucus membranes Neck: Symmetric, trachea midline, no c spine tenderness BACK: no bruising, no midline t/l spine tenderness CVS: +S1/S2, tachycardia radial pulse 2+ bilaterally RESP: Unlabored respiratory effort, Clear to auscultation bilaterally  GI: Nontender/Nondistended, soft abdomen MSK: Extremities w/o deformity or ttp  Neuro: GCS=15, CNs II-XII grossly intact,  Sensation grossly intact  Psych: Awake, Alert, & Orientedx3;  Appropriate mood and affect, cooperative  Findings concerning for primary afib vs secondary will obtain labs, dimer and reassess. HR in the 130-150s, pt w/ no active cp. Will give hydration.

## 2021-10-20 NOTE — ASSESSMENT
[FreeTextEntry1] : Is a 60-year-old female whose history has been reviewed above\par \par She presents today in atrial for flutter.  She is difficult in terms of history she states that she had arm pain 3 days ago which was intense radiating through her shoulder.  She states that her father-in-law had a similar process\par \par She states that she never followed up with pulmonary because she was feeling better.\par \par However today she does state that she does have a little fluid feeling.\par \par Her lungs are clear she has no rub\par \par Differential diagnosis includes pleuropericarditis pulmonary embolus or atrial fibrillation\par \par Does have a history of Behcet's disease disease which may make this more likely to be embolic

## 2021-10-20 NOTE — CONSULT NOTE ADULT - SUBJECTIVE AND OBJECTIVE BOX
HPI:  60 year old F with PMHx of hypothyroid, Behcet's disease who presented to the ED with chief complaint of lightheadedness and palpitations. She woke up in the morning feeling well, then started to develop lightheadedness a couple of hours later. She endorses two episodes of dizziness associated with palpitations, which prompted her visit to see her PMD. She was found to be in AFib with RVR and was sent to the ER.  Of note, the patient had a similar episode of Afib 1-2 years go. She had been initially started on Metoprolol and Eliquis, however these were stopped 2 weeks later by her cardiologist in [facility] after she spontaneously converted back to NSR - at the time her AFib was believed to be cause by a recent viral infection.  Denies Cp, SOB, abd pain, NVD, dysuria.    ER course: Patient was given 12 mg diltiazem with improvement in HR to 120s-130s. Patient was noted to have 4-5 documented pauses with each ranging from 4-6 seconds. Otherwise patient with afib in 140s-150s with reassuring BPs. Mag was ordered and patient ws placed on telemetry monitoring.  Patient transferred to CICU for ablation vs PM placement.      (20 Oct 2021 18:27)      PAST MEDICAL & SURGICAL HISTORY:  Hypothyroid    Behcet&#x27;s disease    Atrial fibrillation        Review of Systems:   CONSTITUTIONAL: No fever, weight loss, or fatigue  EYES: No eye pain, visual disturbances, or discharge  ENMT:  No difficulty hearing, tinnitus, vertigo; No sinus or throat pain  NECK: No pain or stiffness  BREASTS: No pain, masses, or nipple discharge  RESPIRATORY: No cough, wheezing, chills or hemoptysis; No shortness of breath  CARDIOVASCULAR: No chest pain, palpitations, dizziness, or leg swelling  GASTROINTESTINAL: No abdominal or epigastric pain. No nausea, vomiting, or hematemesis; No diarrhea or constipation. No melena or hematochezia.  GENITOURINARY: No dysuria, frequency, hematuria, or incontinence  NEUROLOGICAL: No headaches, memory loss, loss of strength, numbness, or tremors  SKIN: No itching, burning, rashes, or lesions   LYMPH NODES: No enlarged glands  ENDOCRINE: No heat or cold intolerance; No hair loss  MUSCULOSKELETAL: No joint pain or swelling; No muscle, back, or extremity pain  PSYCHIATRIC: No depression, anxiety, mood swings, or difficulty sleeping  HEME/LYMPH: No easy bruising, or bleeding gums  ALLERY AND IMMUNOLOGIC: No hives or eczema    Allergies    sulfa drugs (Unknown)    Intolerances        Social History:     FAMILY HISTORY:      MEDICATIONS  (STANDING):  heparin  Infusion.  Unit(s)/Hr (9 mL/Hr) IV Continuous <Continuous>    MEDICATIONS  (PRN):  heparin   Injectable 4000 Unit(s) IV Push every 6 hours PRN For aPTT less than 40  heparin   Injectable 2000 Unit(s) IV Push every 6 hours PRN For aPTT between 40 - 57        CAPILLARY BLOOD GLUCOSE        I&O's Summary      PHYSICAL EXAM:  GENERAL: NAD, well-developed  HEAD:  Atraumatic, Normocephalic  EYES: EOMI, PERRLA, conjunctiva and sclera clear  NECK: Supple, No JVD  CHEST/LUNG: Clear to auscultation bilaterally; No wheeze  HEART: irregular rate and rhythm; No murmurs, rubs, or gallops  ABDOMEN: Soft, Nontender, Nondistended; Bowel sounds present  EXTREMITIES:  2+ Peripheral Pulses, No clubbing, cyanosis, or edema  PSYCH: AAOx3  NEUROLOGY: non-focal  SKIN: No rashes or lesions    LABS:                        13.6   7.22  )-----------( 175      ( 20 Oct 2021 12:42 )             41.7     10-20    137  |  104  |  10  ----------------------------<  108<H>  4.3   |  17<L>  |  <0.30<L>    Ca    9.6      20 Oct 2021 12:42  Mg     1.9     10-20    TPro  7.3  /  Alb  4.2  /  TBili  0.3  /  DBili  x   /  AST  28  /  ALT  38  /  AlkPhos  73  10-20    PT/INR - ( 20 Oct 2021 12:42 )   PT: 10.9 sec;   INR: 0.90 ratio         PTT - ( 20 Oct 2021 12:42 )  PTT:30.1 sec          RADIOLOGY & ADDITIONAL TESTS:    Imaging Personally Reviewed:    Consultant(s) Notes Reviewed:      Care Discussed with Consultants/Other Providers:  
CHIEF COMPLAINT: AFib w/ RVR, pauses 4-6 seconds    HISTORY OF PRESENT ILLNESS: 60 year old F with PMHx of hypothyroid, Behcet's disease, COVID-19 presents to the ED with chief complaint of lightheadedness and palpitations. She woke up in the morning feeling well, then developed lightheadedness shortly after. She endorses two episodes of dizziness associated with palpitations, which prompted her visit to see her PCP. Found to be in AFib with RVR and was sent to the ER.  Of note, the patient had a similar episode of Afib 1-2 years go after having COVID. She was started on Metoprolol and Eliquis which was d/c'ed 2 weeks later after self-conversion to NSR. Denies CP, SOB, abd pain, NVD, dysuria. In ED, given 12mg diltiazem IV with improvement in rates to 120-130s. Shortly after, noted to have 4-5 pauses on telemetry each with a duration of 4-6 seconds. EP consulted for management.       ALLERGIES:  sulfa drugs (Unknown)    HOME MEDICATIONS:  Levothyroxine    MEDICATIONS:  heparin   Injectable 4000 Unit(s) IV Push every 6 hours PRN  heparin   Injectable 2000 Unit(s) IV Push every 6 hours PRN  heparin  Infusion.  Unit(s)/Hr IV Continuous <Continuous>    PAST MEDICAL & SURGICAL HISTORY:  Hypothyroid  Behcets disease  Atrial fibrillation  COVID 19      FAMILY HISTORY:  non-contributory    SOCIAL HISTORY:    [X]Non-smoker  [ ] Alcohol - socially       REVIEW OF SYSTEMS:  See HPI. Otherwise, 10 point ROS done and otherwise negative.    PHYSICAL EXAM:  T(C): 36.7 (10-20-21 @ 11:58), Max: 36.7 (10-20-21 @ 11:58)  HR: 151 (10-20-21 @ 18:51) (140 - 169)  BP: 120/88 (10-20-21 @ 18:51) (111/74 - 155/108)  RR: 18 (10-20-21 @ 15:00) (17 - 18)  SpO2: 98% (10-20-21 @ 15:00) (95% - 98%)  Wt(kg): --  I&O's Summary      Appearance: Alert. Anxious.  Cardiovascular: +S1S2 irregularly irregular no m/g/r  Respiratory: CTA B/L	  Psychiatry: A & O x 3, Mood & affect appropriate  Gastrointestinal:  Soft, NT. ND., + BS	  Skin: No rashes	  Extremities: No edema BLE  Vascular: Peripheral pulses palpable 2+ bilaterally      LABS:	 	    CBC Full  -  ( 20 Oct 2021 12:42 )  WBC Count : 7.22 K/uL  Hemoglobin : 13.6 g/dL  Hematocrit : 41.7 %  Platelet Count - Automated : 175 K/uL  Mean Cell Volume : 83.6 fl  Mean Cell Hemoglobin : 27.3 pg  Mean Cell Hemoglobin Concentration : 32.6 gm/dL  Auto Neutrophil # : 4.10 K/uL  Auto Lymphocyte # : 2.14 K/uL  Auto Monocyte # : 0.90 K/uL  Auto Eosinophil # : 0.04 K/uL  Auto Basophil # : 0.02 K/uL  Auto Neutrophil % : 56.7 %  Auto Lymphocyte % : 29.6 %  Auto Monocyte % : 12.5 %  Auto Eosinophil % : 0.6 %  Auto Basophil % : 0.3 %    10-20    137  |  104  |  10  ----------------------------<  108<H>  4.3   |  17<L>  |  <0.30<L>    Ca    9.6      20 Oct 2021 12:42  Mg     1.9     10-20    TPro  7.3  /  Alb  4.2  /  TBili  0.3  /  DBili  x   /  AST  28  /  ALT  38  /  AlkPhos  73  10-20      proBNP: Serum Pro-Brain Natriuretic Peptide: 815 pg/mL (10-20 @ 12:42)    TSH: pending    TELEMETRY: AFib w/ -180s, multiple 4-6 second pauses  ECG: AFib w/ RVR, 160bpm    	  RADIOLOGY:  EXAM:  XR CHEST PA LAT 2V                        PROCEDURE DATE:  10/20/2021      INTERPRETATION:  CLINICAL INDICATION: Chest Pain    TECHNIQUE: 2 views; Frontal and lateral views of the chest were obtained.    COMPARISON: Chest radiograph 9/14/2021.    FINDINGS:    Heart size is normal.  Clear lungs.  The visualized osseous structures demonstrate no acute pathology.    IMPRESSION:  Clear lungs.    --- End of Report ---    EMORY CABRERA MD; Resident Radiology  This document has been electronically signed.  CHACE HUBBARD MD; Attending Radiologist  This document has been electronically signed. Oct 20 2021  2:33PM
CHIEF COMPLAINT:  new onset of afib  HISTORY OF PRESENT ILLNESS:  HPI:  60 year old F with PMHx of hypothyroid, Behcet's disease who presented to the ED with chief complaint of lightheadedness and palpitations. She woke up in the morning feeling well, then started to develop lightheadedness a couple of hours later. She endorses two episodes of dizziness associated with palpitations, which prompted her visit to see her PMD. She was found to be in AFib with RVR and was sent to the ER.  Of note, the patient had a similar episode of Afib 1-2 years go. She had been initially started on Metoprolol and Eliquis, however these were stopped 2 weeks later by her cardiologist in [facility] after she spontaneously converted back to NSR - at the time her AFib was believed to be cause by a recent viral infection.  Denies Cp, SOB, abd pain, NVD, dysuria.    ER course: Patient was given 12 mg diltiazem with improvement in HR to 120s-130s. Patient was noted to have 4-5 documented pauses with each ranging from 4-6 seconds. Otherwise patient with afib in 140s-150s with reassuring BPs. Mag was ordered and patient ws placed on telemetry monitoring.  Patient transferred to CICU for ablation vs PM placement.     Patient also had spontaneous conversion pauses with dizziness  Presently in afib with rapid VR stable hemodynamically  EP consult appreciated    PAST MEDICAL & SURGICAL HISTORY:  Hypothyroid    Behcet&#x27;s disease    Atrial fibrillation            MEDICATIONS:  heparin  Infusion.  Unit(s)/Hr IV Continuous <Continuous>              chlorhexidine 2% Cloths 1 Application(s) Topical daily      FAMILY HISTORY:      Allergies    sulfa drugs (Unknown)    Intolerances    	  [ ] All others negative	  [ ] Unable to obtain    PHYSICAL EXAM:  T(C): 36.8 (10-20-21 @ 19:19), Max: 36.8 (10-20-21 @ 19:19)  HR: 144 (10-20-21 @ 19:19) (140 - 169)  BP: 114/89 (10-20-21 @ 19:19) (111/74 - 155/108)  RR: 24 (10-20-21 @ 19:19) (17 - 24)  SpO2: 99% (10-20-21 @ 19:19) (95% - 99%)  Wt(kg): --  I&O's Summary      Appearance: Normal	  HEENT:   Normal oral mucosa, PERRL, EOMI	  Cardiovascular: Normal S1 S2, No JVD, No murmurs  Respiratory: Lungs clear to auscultation	  Psychiatry: A & O x 3, Mood & affect appropriate  Gastrointestinal:  Soft, Non-tender, + BS	  Skin: No rashes, No ecchymoses, No cyanosis	  Neurologic: Non-focal  Extremities: No clubbing, cyanosis or edema  Vascular: Peripheral pulses palpable 2+ bilaterally    TELEMETRY: 	    ECG:  	  RADIOLOGY:  OTHER: 	  	  LABS:	 	    CARDIAC MARKERS:                                  13.6   7.22  )-----------( 175      ( 20 Oct 2021 12:42 )             41.7     10-20    137  |  104  |  10  ----------------------------<  108<H>  4.3   |  17<L>  |  <0.30<L>    Ca    9.6      20 Oct 2021 12:42  Mg     1.9     10-20    TPro  7.3  /  Alb  4.2  /  TBili  0.3  /  DBili  x   /  AST  28  /  ALT  38  /  AlkPhos  73  10-20    proBNP: Serum Pro-Brain Natriuretic Peptide: 815 pg/mL (10-20 @ 12:42)    Lipid Profile:   HgA1c:   TSH:       ecg Susan with rapid VR otherwise WNL  symptomatic pauses

## 2021-10-20 NOTE — CONSULT NOTE ADULT - ASSESSMENT
1. afib with rapid VR with pauses  2. hemodynamiclly stable  3. pasuses of concern  4. hx of Etoh elicited by EP    Recommend  move to CCU for observation  as per EP hold off on temporary pacemaker  watch for pauses  IV heparin  if doesn't convert- abaltion of afib in am as per Dr. Morgan

## 2021-10-20 NOTE — HISTORY OF PRESENT ILLNESS
[FreeTextEntry8] : This is a 60-year-old female who comes in stating that she is in atrial fibrillation.  She apparently had a similar episode in the past was seen by cardiologist who told her that she had atrial fibrillation that was probably Covid induced she never followed up\par \par She did have an March an episode of shortness of breath she was [facility] in [facility].  She had a good pulse ox she did have possibly pleural effusion.  She felt better and again did not follow-up\par \par Approximately 3 days ago she developed severe right arm pain plus minus pleuritic component she states that her father-in-law had the same process.  She also had some vertigo and then today noticed that she was in a rapid pulse

## 2021-10-20 NOTE — H&P ADULT - HISTORY OF PRESENT ILLNESS
60 year old F with PMHx of hypothyroid, Behcet's disease who presented to the ED with chief complaint of lightheadedness and palpitations. She woke up in the morning feeling well, then started to develop lightheadedness a couple of hours later. She endorses two episodes of dizziness associated with palpitations, which prompted her visit to see her PMD. She was found to be in AFib with RVR and was sent to the ER.  Of note, the patient had a similar episode of Afib 1-2 years go. She had been initially started on Metoprolol and Eliquis, however these were stopped 2 weeks later by her cardiologist in [facility] after she spontaneously converted back to NSR - at the time her AFib was believed to be cause by a recent viral infection.  Denies Cp, SOB, abd pain, NVD, dysuria.    ER course: Patient was given 12 mg diltiazem with improvement in HR to 120s-130s. Patient was then noted to have a 4 and 6 second pause on tele without conversion. EP was consulted. Patient was then noted to have 4-5 documented pasues ranging from 4-6 seconds. Otherwise in afib in 140s-150s. BP reassuring. Mag was ordered and patient started on tele monitoring in place.  Patient transferred to CICU for PPM placement.      60 year old F with PMHx of hypothyroid, Behcet's disease who presented to the ED with chief complaint of lightheadedness and palpitations. She woke up in the morning feeling well, then started to develop lightheadedness a couple of hours later. She endorses two episodes of dizziness associated with palpitations, which prompted her visit to see her PMD. She was found to be in AFib with RVR and was sent to the ER.  Of note, the patient had a similar episode of Afib 1-2 years go. She had been initially started on Metoprolol and Eliquis, however these were stopped 2 weeks later by her cardiologist in [facility] after she spontaneously converted back to NSR - at the time her AFib was believed to be cause by a recent viral infection.  Denies Cp, SOB, abd pain, NVD, dysuria.    ER course: Patient was given 12 mg diltiazem with improvement in HR to 120s-130s. Patient was noted to have 4-5 documented pauses with each ranging from 4-6 seconds. Otherwise patient with afib in 140s-150s with reassuring BPs. Mag was ordered and patient ws placed on telemetry monitoring.  Patient transferred to CICU for ablation vs PM placement.

## 2021-10-21 ENCOUNTER — TRANSCRIPTION ENCOUNTER (OUTPATIENT)
Age: 61
End: 2021-10-21

## 2021-10-21 VITALS
OXYGEN SATURATION: 100 % | HEART RATE: 76 BPM | RESPIRATION RATE: 16 BRPM | DIASTOLIC BLOOD PRESSURE: 68 MMHG | SYSTOLIC BLOOD PRESSURE: 93 MMHG

## 2021-10-21 PROBLEM — M35.2 BEHCET'S DISEASE: Chronic | Status: ACTIVE | Noted: 2021-10-20

## 2021-10-21 PROBLEM — E03.9 HYPOTHYROIDISM, UNSPECIFIED: Chronic | Status: ACTIVE | Noted: 2021-10-20

## 2021-10-21 LAB
A1C WITH ESTIMATED AVERAGE GLUCOSE RESULT: 5.5 % — SIGNIFICANT CHANGE UP (ref 4–5.6)
AMPHET UR-MCNC: NEGATIVE — SIGNIFICANT CHANGE UP
ANION GAP SERPL CALC-SCNC: 14 MMOL/L — SIGNIFICANT CHANGE UP (ref 5–17)
APTT BLD: 60.1 SEC — HIGH (ref 27.5–35.5)
BARBITURATES UR SCN-MCNC: NEGATIVE — SIGNIFICANT CHANGE UP
BASOPHILS # BLD AUTO: 0.03 K/UL — SIGNIFICANT CHANGE UP (ref 0–0.2)
BASOPHILS NFR BLD AUTO: 0.4 % — SIGNIFICANT CHANGE UP (ref 0–2)
BENZODIAZ UR-MCNC: NEGATIVE — SIGNIFICANT CHANGE UP
BLD GP AB SCN SERPL QL: NEGATIVE — SIGNIFICANT CHANGE UP
BUN SERPL-MCNC: 12 MG/DL — SIGNIFICANT CHANGE UP (ref 7–23)
CALCIUM SERPL-MCNC: 8.6 MG/DL — SIGNIFICANT CHANGE UP (ref 8.4–10.5)
CHLORIDE SERPL-SCNC: 106 MMOL/L — SIGNIFICANT CHANGE UP (ref 96–108)
CHOLEST SERPL-MCNC: 127 MG/DL — SIGNIFICANT CHANGE UP
CO2 SERPL-SCNC: 18 MMOL/L — LOW (ref 22–31)
COCAINE METAB.OTHER UR-MCNC: NEGATIVE — SIGNIFICANT CHANGE UP
COVID-19 NUCLEOCAPSID GAM AB INTERP: NEGATIVE — SIGNIFICANT CHANGE UP
COVID-19 NUCLEOCAPSID TOTAL GAM ANTIBODY RESULT: 0.07 INDEX — SIGNIFICANT CHANGE UP
COVID-19 SPIKE DOMAIN AB INTERP: POSITIVE
COVID-19 SPIKE DOMAIN ANTIBODY RESULT: >250 U/ML — HIGH
CREAT SERPL-MCNC: <0.3 MG/DL — LOW (ref 0.5–1.3)
EOSINOPHIL # BLD AUTO: 0.09 K/UL — SIGNIFICANT CHANGE UP (ref 0–0.5)
EOSINOPHIL NFR BLD AUTO: 1.2 % — SIGNIFICANT CHANGE UP (ref 0–6)
ESTIMATED AVERAGE GLUCOSE: 111 MG/DL — SIGNIFICANT CHANGE UP (ref 68–114)
GLUCOSE SERPL-MCNC: 125 MG/DL — HIGH (ref 70–99)
HCT VFR BLD CALC: 38.4 % — SIGNIFICANT CHANGE UP (ref 34.5–45)
HCV AB S/CO SERPL IA: 0.15 S/CO — SIGNIFICANT CHANGE UP (ref 0–0.99)
HCV AB SERPL-IMP: SIGNIFICANT CHANGE UP
HDLC SERPL-MCNC: 51 MG/DL — SIGNIFICANT CHANGE UP
HGB BLD-MCNC: 12.4 G/DL — SIGNIFICANT CHANGE UP (ref 11.5–15.5)
IMM GRANULOCYTES NFR BLD AUTO: 0.1 % — SIGNIFICANT CHANGE UP (ref 0–1.5)
INR BLD: 0.9 RATIO — SIGNIFICANT CHANGE UP (ref 0.88–1.16)
LIPID PNL WITH DIRECT LDL SERPL: 44 MG/DL — SIGNIFICANT CHANGE UP
LYMPHOCYTES # BLD AUTO: 2.39 K/UL — SIGNIFICANT CHANGE UP (ref 1–3.3)
LYMPHOCYTES # BLD AUTO: 32.3 % — SIGNIFICANT CHANGE UP (ref 13–44)
MAGNESIUM SERPL-MCNC: 2.2 MG/DL — SIGNIFICANT CHANGE UP (ref 1.6–2.6)
MCHC RBC-ENTMCNC: 26.8 PG — LOW (ref 27–34)
MCHC RBC-ENTMCNC: 32.3 GM/DL — SIGNIFICANT CHANGE UP (ref 32–36)
MCV RBC AUTO: 83.1 FL — SIGNIFICANT CHANGE UP (ref 80–100)
METHADONE UR-MCNC: NEGATIVE — SIGNIFICANT CHANGE UP
MONOCYTES # BLD AUTO: 0.74 K/UL — SIGNIFICANT CHANGE UP (ref 0–0.9)
MONOCYTES NFR BLD AUTO: 10 % — SIGNIFICANT CHANGE UP (ref 2–14)
NEUTROPHILS # BLD AUTO: 4.14 K/UL — SIGNIFICANT CHANGE UP (ref 1.8–7.4)
NEUTROPHILS NFR BLD AUTO: 56 % — SIGNIFICANT CHANGE UP (ref 43–77)
NON HDL CHOLESTEROL: 75 MG/DL — SIGNIFICANT CHANGE UP
NRBC # BLD: 0 /100 WBCS — SIGNIFICANT CHANGE UP (ref 0–0)
OPIATES UR-MCNC: NEGATIVE — SIGNIFICANT CHANGE UP
OXYCODONE UR-MCNC: NEGATIVE — SIGNIFICANT CHANGE UP
PCP SPEC-MCNC: SIGNIFICANT CHANGE UP
PCP UR-MCNC: NEGATIVE — SIGNIFICANT CHANGE UP
PHOSPHATE SERPL-MCNC: 3.6 MG/DL — SIGNIFICANT CHANGE UP (ref 2.5–4.5)
PLATELET # BLD AUTO: 156 K/UL — SIGNIFICANT CHANGE UP (ref 150–400)
POTASSIUM SERPL-MCNC: 3.7 MMOL/L — SIGNIFICANT CHANGE UP (ref 3.5–5.3)
POTASSIUM SERPL-SCNC: 3.7 MMOL/L — SIGNIFICANT CHANGE UP (ref 3.5–5.3)
PROTHROM AB SERPL-ACNC: 10.8 SEC — SIGNIFICANT CHANGE UP (ref 10.6–13.6)
RBC # BLD: 4.62 M/UL — SIGNIFICANT CHANGE UP (ref 3.8–5.2)
RBC # FLD: 13 % — SIGNIFICANT CHANGE UP (ref 10.3–14.5)
RH IG SCN BLD-IMP: POSITIVE — SIGNIFICANT CHANGE UP
SARS-COV-2 IGG+IGM SERPL QL IA: 0.07 INDEX — SIGNIFICANT CHANGE UP
SARS-COV-2 IGG+IGM SERPL QL IA: >250 U/ML — HIGH
SARS-COV-2 IGG+IGM SERPL QL IA: NEGATIVE — SIGNIFICANT CHANGE UP
SARS-COV-2 IGG+IGM SERPL QL IA: POSITIVE
SARS-COV-2 RNA SPEC QL NAA+PROBE: SIGNIFICANT CHANGE UP
SODIUM SERPL-SCNC: 138 MMOL/L — SIGNIFICANT CHANGE UP (ref 135–145)
THC UR QL: NEGATIVE — SIGNIFICANT CHANGE UP
TRIGL SERPL-MCNC: 157 MG/DL — HIGH
WBC # BLD: 7.4 K/UL — SIGNIFICANT CHANGE UP (ref 3.8–10.5)
WBC # FLD AUTO: 7.4 K/UL — SIGNIFICANT CHANGE UP (ref 3.8–10.5)

## 2021-10-21 PROCEDURE — 85025 COMPLETE CBC W/AUTO DIFF WBC: CPT

## 2021-10-21 PROCEDURE — 85730 THROMBOPLASTIN TIME PARTIAL: CPT

## 2021-10-21 PROCEDURE — 84484 ASSAY OF TROPONIN QUANT: CPT

## 2021-10-21 PROCEDURE — 93656 COMPRE EP EVAL ABLTJ ATR FIB: CPT

## 2021-10-21 PROCEDURE — 83036 HEMOGLOBIN GLYCOSYLATED A1C: CPT

## 2021-10-21 PROCEDURE — 0225U NFCT DS DNA&RNA 21 SARSCOV2: CPT

## 2021-10-21 PROCEDURE — 86900 BLOOD TYPING SEROLOGIC ABO: CPT

## 2021-10-21 PROCEDURE — 71275 CT ANGIOGRAPHY CHEST: CPT | Mod: MA

## 2021-10-21 PROCEDURE — C1769: CPT

## 2021-10-21 PROCEDURE — 82435 ASSAY OF BLOOD CHLORIDE: CPT

## 2021-10-21 PROCEDURE — 82330 ASSAY OF CALCIUM: CPT

## 2021-10-21 PROCEDURE — 93306 TTE W/DOPPLER COMPLETE: CPT | Mod: 26

## 2021-10-21 PROCEDURE — 93005 ELECTROCARDIOGRAM TRACING: CPT

## 2021-10-21 PROCEDURE — 80053 COMPREHEN METABOLIC PANEL: CPT

## 2021-10-21 PROCEDURE — 80061 LIPID PANEL: CPT

## 2021-10-21 PROCEDURE — 84295 ASSAY OF SERUM SODIUM: CPT

## 2021-10-21 PROCEDURE — 80307 DRUG TEST PRSMV CHEM ANLYZR: CPT

## 2021-10-21 PROCEDURE — C1730: CPT

## 2021-10-21 PROCEDURE — 85014 HEMATOCRIT: CPT

## 2021-10-21 PROCEDURE — 93657 TX L/R ATRIAL FIB ADDL: CPT

## 2021-10-21 PROCEDURE — 84132 ASSAY OF SERUM POTASSIUM: CPT

## 2021-10-21 PROCEDURE — 71046 X-RAY EXAM CHEST 2 VIEWS: CPT

## 2021-10-21 PROCEDURE — C8929: CPT

## 2021-10-21 PROCEDURE — 86901 BLOOD TYPING SEROLOGIC RH(D): CPT

## 2021-10-21 PROCEDURE — 84443 ASSAY THYROID STIM HORMONE: CPT

## 2021-10-21 PROCEDURE — 85610 PROTHROMBIN TIME: CPT

## 2021-10-21 PROCEDURE — C1894: CPT

## 2021-10-21 PROCEDURE — 83735 ASSAY OF MAGNESIUM: CPT

## 2021-10-21 PROCEDURE — 86803 HEPATITIS C AB TEST: CPT

## 2021-10-21 PROCEDURE — 85379 FIBRIN DEGRADATION QUANT: CPT

## 2021-10-21 PROCEDURE — U0003: CPT

## 2021-10-21 PROCEDURE — 93655 ICAR CATH ABLTJ DSCRT ARRHYT: CPT

## 2021-10-21 PROCEDURE — 86769 SARS-COV-2 COVID-19 ANTIBODY: CPT

## 2021-10-21 PROCEDURE — 83605 ASSAY OF LACTIC ACID: CPT

## 2021-10-21 PROCEDURE — 93010 ELECTROCARDIOGRAM REPORT: CPT

## 2021-10-21 PROCEDURE — 93613 INTRACARDIAC EPHYS 3D MAPG: CPT

## 2021-10-21 PROCEDURE — C1759: CPT

## 2021-10-21 PROCEDURE — 99285 EMERGENCY DEPT VISIT HI MDM: CPT

## 2021-10-21 PROCEDURE — 80048 BASIC METABOLIC PNL TOTAL CA: CPT

## 2021-10-21 PROCEDURE — 83880 ASSAY OF NATRIURETIC PEPTIDE: CPT

## 2021-10-21 PROCEDURE — 82803 BLOOD GASES ANY COMBINATION: CPT

## 2021-10-21 PROCEDURE — 85018 HEMOGLOBIN: CPT

## 2021-10-21 PROCEDURE — C1732: CPT

## 2021-10-21 PROCEDURE — C1760: CPT

## 2021-10-21 PROCEDURE — 86850 RBC ANTIBODY SCREEN: CPT

## 2021-10-21 PROCEDURE — 93662 INTRACARDIAC ECG (ICE): CPT

## 2021-10-21 PROCEDURE — U0005: CPT

## 2021-10-21 PROCEDURE — 84100 ASSAY OF PHOSPHORUS: CPT

## 2021-10-21 PROCEDURE — 99291 CRITICAL CARE FIRST HOUR: CPT | Mod: 25

## 2021-10-21 PROCEDURE — 82947 ASSAY GLUCOSE BLOOD QUANT: CPT

## 2021-10-21 PROCEDURE — C1893: CPT

## 2021-10-21 PROCEDURE — C1889: CPT

## 2021-10-21 PROCEDURE — 36415 COLL VENOUS BLD VENIPUNCTURE: CPT

## 2021-10-21 PROCEDURE — 93662 INTRACARDIAC ECG (ICE): CPT | Mod: 26

## 2021-10-21 RX ORDER — FENTANYL CITRATE 50 UG/ML
12.5 INJECTION INTRAVENOUS ONCE
Refills: 0 | Status: DISCONTINUED | OUTPATIENT
Start: 2021-10-21 | End: 2021-10-21

## 2021-10-21 RX ORDER — PANTOPRAZOLE SODIUM 20 MG/1
1 TABLET, DELAYED RELEASE ORAL
Qty: 30 | Refills: 0
Start: 2021-10-21 | End: 2021-11-19

## 2021-10-21 RX ORDER — RIVAROXABAN 15 MG-20MG
1 KIT ORAL
Qty: 30 | Refills: 2
Start: 2021-10-21 | End: 2022-01-18

## 2021-10-21 RX ORDER — RIVAROXABAN 15 MG-20MG
20 KIT ORAL
Refills: 0 | Status: DISCONTINUED | OUTPATIENT
Start: 2021-10-21 | End: 2021-10-21

## 2021-10-21 RX ORDER — POTASSIUM CHLORIDE 20 MEQ
40 PACKET (EA) ORAL ONCE
Refills: 0 | Status: COMPLETED | OUTPATIENT
Start: 2021-10-21 | End: 2021-10-21

## 2021-10-21 RX ORDER — FOLIC ACID 0.8 MG
1 TABLET ORAL
Qty: 0 | Refills: 0 | DISCHARGE
Start: 2021-10-21

## 2021-10-21 RX ORDER — THIAMINE MONONITRATE (VIT B1) 100 MG
1 TABLET ORAL
Qty: 0 | Refills: 0 | DISCHARGE
Start: 2021-10-21

## 2021-10-21 RX ORDER — PANTOPRAZOLE SODIUM 20 MG/1
40 TABLET, DELAYED RELEASE ORAL
Refills: 0 | Status: DISCONTINUED | OUTPATIENT
Start: 2021-10-21 | End: 2021-10-21

## 2021-10-21 RX ADMIN — Medication 62.5 MILLIMOLE(S): at 00:40

## 2021-10-21 RX ADMIN — Medication 100 MILLIGRAM(S): at 15:37

## 2021-10-21 RX ADMIN — HEPARIN SODIUM 9 UNIT(S)/HR: 5000 INJECTION INTRAVENOUS; SUBCUTANEOUS at 05:27

## 2021-10-21 RX ADMIN — Medication 1 MILLIGRAM(S): at 14:32

## 2021-10-21 RX ADMIN — Medication 1 TABLET(S): at 14:32

## 2021-10-21 RX ADMIN — HEPARIN SODIUM 9 UNIT(S)/HR: 5000 INJECTION INTRAVENOUS; SUBCUTANEOUS at 01:26

## 2021-10-21 RX ADMIN — Medication 50 GRAM(S): at 00:41

## 2021-10-21 RX ADMIN — PANTOPRAZOLE SODIUM 40 MILLIGRAM(S): 20 TABLET, DELAYED RELEASE ORAL at 15:37

## 2021-10-21 RX ADMIN — FENTANYL CITRATE 12.5 MICROGRAM(S): 50 INJECTION INTRAVENOUS at 13:45

## 2021-10-21 RX ADMIN — Medication 40 MILLIEQUIVALENT(S): at 05:27

## 2021-10-21 RX ADMIN — FENTANYL CITRATE 12.5 MICROGRAM(S): 50 INJECTION INTRAVENOUS at 13:31

## 2021-10-21 NOTE — DISCHARGE NOTE PROVIDER - NSDCFUADDINST_GEN_ALL_CORE_FT
No heavy lifting or strenuous exercise for a week  May shower tomorrow pm (10/22)  Do not rub right groin heavily, gentle clean and dry after shower make sure right groin is dry and clean

## 2021-10-21 NOTE — DISCHARGE NOTE NURSING/CASE MANAGEMENT/SOCIAL WORK - PATIENT PORTAL LINK FT
You can access the FollowMyHealth Patient Portal offered by [facility] by registering at the following website: [email or web address]. By joining [facility]’s FollowMyHealth portal, you will also be able to view your health information using other applications (apps) compatible with our system.

## 2021-10-21 NOTE — PROGRESS NOTE ADULT - ATTENDING COMMENTS
Atrial fibrillation with rapid ventricular rate, seen to have conversion pauses > 5 seconds (symptomatic).  Plan for catheter ablation of atrial fibrillation by EP.

## 2021-10-21 NOTE — PROGRESS NOTE ADULT - ASSESSMENT
60 year old F with PMHx of hypothyroid, Behcet's disease who presented to the ED with chief complaint of lightheadedness and palpitations, found to be in AFib with RVR with conversion pauses after receiving diltiazem, transferred to CICU now pending ablation vs PPM.     #Cardiovascular  Afib w/ RVR and conversion pauses/sinus pauses.   - unclear etiology: possibly from EtOH use. Will check TSH and electrolytes - electrolytes wnl, TSH pending  - s/p IV diltiazem in ED with cardioversion to NSR accompanied by conversion pause of 6 seconds. Having sinus pauses 4s-7s afterwards intermittently with symptoms.  - Discussed with Dr. Morgan (EP): hold off on transvenous pacemaker or rate control meds overnight.  - will monitor patient on tele in CCU.   - gentle mIVF.  - Transcutaneous pacer pads on patient.  - scheduled for Afib ablation in the AM. NPO since MN, COVID, pre-op labs.  - EP following.    #Neuro  AOx3, no acute issues  Patient w/ EtOH use. Will send Utox and EtoH level.   Folic acid, thiamine, MVI.  PRN ativan, xanax    #Respiratory  Hx Behcet disease  - Pulmonary CTA to eval for PE - neg for PE  - Sating well RA, goal SpO2 > 92%    #GI/Nutrition  - no active issue.  - NPO after MN    #/Renal  - No acute issues  - Trend I/Os, monitor UOP    #Hem-Onc  - heparin gtt    #Endo  Hypothyroid  No acute issues     #ID  No acute issues  - Trend WBC, fevers    Disposition: CICU

## 2021-10-21 NOTE — DISCHARGE NOTE PROVIDER - CARE PROVIDER_API CALL
Tricia Morgan (MD; PhD)  Cardiac Electrophysiology; Cardiovascular Disease; Internal Medicine  [facility] - Dept of Cardiology, [address] [place] [address]  Phone: [phone]  Fax: [phone]  Scheduled Appointment: 11/12/2021 01:00 PM

## 2021-10-21 NOTE — PROGRESS NOTE ADULT - SUBJECTIVE AND OBJECTIVE BOX
INTERVAL EVENTS: No o/n events. Denies CP, dyspnea, palpitations, presyncope, syncope, f/c/n/v.     REVIEW OF SYSTEMS:  Constitutional:     [X] negative [ ] fevers [ ] chills [ ] weight loss [ ] weight gain  HEENT:                  [X] negative [ ] dry eyes [ ] eye irritation [ ] postnasal drip [ ] nasal congestion  CV:                         [X] negative  [ ] chest pain [ ] orthopnea [ ] palpitations [ ] murmur  Resp:                     [X] negative [ ] cough [ ] shortness of breath [ ] wheezing [ ] sputum [ ] hemoptysis  GI:                          [X] negative [ ] nausea [ ] vomiting [ ] diarrhea [ ] constipation [ ] abd pain [ ] dysphagia   :                        [X] negative [ ] dysuria [ ] nocturia [ ] hematuria [ ] increased urinary frequency  MSK:                      [X] negative [ ] back pain [ ] myalgias [ ] arthralgias [ ] fracture  Skin:                       [X] negative [ ] rash [ ] itch  Neuro:                   [X] negative [ ] headache [ ] dizziness [ ] syncope [ ] weakness [ ] numbness  Psych:                    [X] negative [ ] anxiety [ ] depression  Endo:                     [X] negative [ ] diabetes [ ] thyroid problem  Heme/Lymph:      [X] negative [ ] anemia [ ] bleeding problem  Allergic/Immune: [X] negative [ ] itchy eyes [ ] nasal discharge [ ] hives [ ] angioedema    [X] All other systems negative or otherwise described above.  [ ] Unable to assess ROS because ________.    PAST MEDICAL & SURGICAL HISTORY:  Hypothyroid    Behcet&#x27;s disease    Atrial fibrillation      MEDICATIONS  (STANDING):  chlorhexidine 2% Cloths 1 Application(s) Topical daily  folic acid 1 milliGRAM(s) Oral daily  heparin  Infusion 900 Unit(s)/Hr (9 mL/Hr) IV Continuous <Continuous>  influenza   Vaccine 0.5 milliLiter(s) IntraMuscular once  multivitamin 1 Tablet(s) Oral daily  thiamine 100 milliGRAM(s) Oral daily    MEDICATIONS  (PRN):  ALPRAZolam 0.25 milliGRAM(s) Oral at bedtime PRN anxiety  LORazepam     Tablet 2 milliGRAM(s) Oral every 2 hours PRN Symptom-triggered 2 point increase in CIWA-Ar  LORazepam   Injectable 2 milliGRAM(s) IV Push every 1 hour PRN Symptom-triggered: each CIWA -Ar score 8 or GREATER  LORazepam   Injectable 2 milliGRAM(s) IV Push every 2 hours PRN CIWA-Ar score increase by 2 points and a total score of 7 or less  LORazepam   Injectable 2 milliGRAM(s) IV Push every 1 hour PRN CIWA-Ar score 8 or greater    ICU Vital Signs Last 24 Hrs  T(C): 36.8 (21 Oct 2021 09:15), Max: 36.8 (20 Oct 2021 19:19)  T(F): 98.3 (21 Oct 2021 08:00), Max: 98.3 (20 Oct 2021 19:19)  HR: 150 (21 Oct 2021 09:15) (140 - 169)  BP: 106/75 (21 Oct 2021 09:15) (105/85 - 142/117)  BP(mean): 86 (21 Oct 2021 09:15) (86 - 102)  ABP: --  ABP(mean): --  RR: 98 (21 Oct 2021 09:15) (17 - 98)  SpO2: 98% (21 Oct 2021 09:15) (96% - 100%)    Orthostatic VS    Daily Height in cm: 154.94 (21 Oct 2021 09:15)    Daily Weight in k.9 (21 Oct 2021 03:00)  I&O's Summary    20 Oct 2021 07:01  -  21 Oct 2021 07:00  --------------------------------------------------------  IN: 681 mL / OUT: 1200 mL / NET: -519 mL        PHYSICAL EXAM:  GENERAL: No acute distress, well-developed  HEAD:  Atraumatic, Normocephalic  ENT: EOMI, conjunctiva and sclera clear, Neck supple, No JVD, moist mucosa  CHEST/LUNG: Clear to auscultation bilaterally; No wheeze, equal breath sounds bilaterally   BACK: No spinal tenderness  HEART: Regular rate and rhythm; No murmurs, rubs, or gallops, radial and DP 2+ b/l, euvolemic  ABDOMEN: Soft, Nontender, Nondistended  EXTREMITIES:  No clubbing, cyanosis, or edema  PSYCH: Nl behavior, nl affect  NEUROLOGY: AAOx3, non-focal  SKIN: Normal color, No rashes or lesions  LINES: no central lines present     LABS:                        12.4   7.40  )-----------( 156      ( 21 Oct 2021 04:24 )             38.4     PT/INR - ( 21 Oct 2021 04:24 )   PT: 10.8 sec;   INR: 0.90 ratio         PTT - ( 21 Oct 2021 04:24 )  PTT:60.1 sec  10-21    138  |  106  |  12  ----------------------------<  125<H>  3.7   |  18<L>  |  <0.30<L>    Ca    8.6      21 Oct 2021 04:24  Phos  3.6     10-21  Mg     2.2     10-21    TPro  6.8  /  Alb  3.7  /  TBili  0.3  /  DBili  x   /  AST  23  /  ALT  32  /  AlkPhos  68  10-20      Troponin T, High Sensitivity (10.20.21 @ 12:42)    Troponin T, High Sensitivity Result: <6 ng/L  proBNP: Serum Pro-Brain Natriuretic Peptide: 815 pg/mL (10-20-21 @ 12:42)  Lipid Profile: Cholesterol 127 mg/dL, Triglyceride 157 mg/dL, LDL 44 mg/dL, HDL 51 mg/dL, [10-21-21]  HgA1c:   TSH:         RADIOLOGY & ADDITIONAL STUDIES:    Cardiovascular Diagnostic Testing    ECG: Personally reviewed    Telemetry: reviewed    Echo: Personally reviewed  < from: TTE with Doppler (w/Cont) (10.21.21 @ 08:30) >  ------------------------------------------------------------------------  Dimensions:    Normal Values:  LA:     2.9    2.0 - 4.0 cm  Ao:     2.8    2.0 - 3.8 cm  SEPTUM: 1.0    0.6 - 1.2 cm  PWT:    0.9    0.6 - 1.1 cm  LVIDd:3.3    3.0 - 5.6 cm  LVIDs:  3.1    1.8 - 4.0 cm  Derived variables:  LVMI: 61 g/m2  RWT: 0.54  Fractional short: 6 %  EF (Visual Estimate): 55-60 %  Doppler Peak Velocity (m/sec): AoV=1.2  ------------------------------------------------------------------------  Observations:  Mitral Valve: Normal mitral valve.  Aortic Valve/Aorta: Normal trileaflet aortic valve. Peak  transaortic valve gradient equals 6 mm Hg, mean transaortic  valve gradient equals 4 mm Hg, aortic valve velocity time  integral equals 20 cm. Peak left ventricular outflow tract  gradient equals 4 mm Hg, mean gradient is equal to 2 mm Hg,  LVOT velocity time integral equals 14 cm.  Aortic Root: 2.8 cm.  LVOT diameter: 1.6 cm.  Left Atrium: Normal left atrium.  Left Ventricle: Septal motion consistent with conduction  defect. Endocardial visualization enhanced with intravenous  injection of Ultrasonic Enhancing Agent (Definity). Despite  echo contrast, difficult to assess left ventricular  function with ventricular rates in the 150s, but appears to  be grossly normal without obvious wall motion  abnormalities.  Grossly normal left ventricular internal  dimensions and wall thicknesses.  Right Heart: Normal right atrium. Grossly normal right  ventricular size and function. Normal tricuspid valve.  Normal pulmonic valve.  Pericardium/Pleura: Normal pericardium with no pericardial  effusion.  Hemodynamic: Estimated right atrial pressure is 8 mm Hg.  ------------------------------------------------------------------------  Conclusions:  1. Grossly normal left ventricular internal dimensions and  wall thicknesses.  2. Septal motion consistent with conduction defect.  Endocardial visualization enhanced with intravenous  injection of Ultrasonic Enhancing Agent (Definity). Despite  echo contrast, difficult to assess left ventricular  function with ventricular rates in the 150s, but appears to  be grossly normal without obvious wall motion  abnormalities.  3. Grossly normal right ventricular size and function.  *** No previous Echo exam.  ------------------------------------------------------------------------  Confirmed on  10/21/2021 - 11:49:34 by Jorge Carlson M.D.  ------------------------------------------------------------------------    < end of copied text >    Stress Testing: none    Cath: none    CXR: Personally reviewed  < from: Xray Chest 2 Views PA/Lat (10.20.21 @ 13:18) >  IMPRESSION:  Clear lungs.    --- End of Report ---    < end of copied text >      Other cardiac imaging: none  < from: CT Angio Cardiac w/wo Cont, Func Eval (08 @ 11:08) >  IMPRESSION:       CT coronary angiography shows:  LMCA: Normal  LAD: Widely patent  LCx: Widely patent  RCA: Dominant and widely patent    Coronary calcium score = 0 Agatston Units ( 10 percentile)    < end of copied text >    Other misc imaging: none  < from: CT Angio Chest PE Protocol w/ IV Cont (10.20.21 @ 17:59) >  IMPRESSION:  No pulmonary embolism.    4 mm right lower lobe subpleural nodule, unchanged from 2008.    < end of copied text >      
  Patient is a 60y old  Female who presents with a chief complaint of AFib with RVR, conversion/sinus pauses (20 Oct 2021 20:55)    HPI:  60 year old F with PMHx of hypothyroid, Behcet's disease who presented to the ED with chief complaint of lightheadedness and palpitations. She woke up in the morning feeling well, then started to develop lightheadedness a couple of hours later. She endorses two episodes of dizziness associated with palpitations, which prompted her visit to see her PMD. She was found to be in AFib with RVR and was sent to the ER.  Of note, the patient had a similar episode of Afib 1-2 years go. She had been initially started on Metoprolol and Eliquis, however these were stopped 2 weeks later by her cardiologist in [facility] after she spontaneously converted back to NSR - at the time her AFib was believed to be cause by a recent viral infection.  Denies Cp, SOB, abd pain, NVD, dysuria.    ER course: Patient was given 12 mg diltiazem with improvement in HR to 120s-130s. Patient was noted to have 4-5 documented pauses with each ranging from 4-6 seconds, symptomatic with dizziness/palpitations. Otherwise patient with afib in 140s-150s with reassuring BPs. Mag was ordered and patient ws placed on telemetry monitoring.  Patient transferred to CICU for ablation vs PM placement.      (20 Oct 2021 18:27)       INTERVAL HPI/OVERNIGHT EVENTS:   2 pauses overnight, one 5 secs and the other 8 secs.   per EP - no TVP, scheduled for ablation this AM  NPO since midnight, heparin gtt held    Subjective:    ICU Vital Signs Last 24 Hrs  T(C): 36.7 (21 Oct 2021 05:00), Max: 36.8 (20 Oct 2021 19:19)  T(F): 98.1 (21 Oct 2021 05:00), Max: 98.3 (20 Oct 2021 19:19)  HR: 151 (21 Oct 2021 06:00) (140 - 169)  BP: 105/85 (21 Oct 2021 06:00) (105/85 - 155/108)  BP(mean): 92 (21 Oct 2021 06:00) (90 - 102)  ABP: --  ABP(mean): --  RR: 26 (21 Oct 2021 06:00) (17 - 28)  SpO2: 98% (21 Oct 2021 06:00) (95% - 100%)    I&O's Summary    20 Oct 2021 07:01  -  21 Oct 2021 07:00  --------------------------------------------------------  IN: 681 mL / OUT: 1200 mL / NET: -519 mL          Daily Height in cm: 154.94 (21 Oct 2021 03:00)    Daily Weight in k.9 (21 Oct 2021 03:00)    Adult Advanced Hemodynamics Last 24 Hrs  CVP(mm Hg): --  CVP(cm H2O): --  CO: --  CI: --  PA: --  PA(mean): --  PCWP: --  SVR: --  SVRI: --  PVR: --  PVRI: --    EKG/Telemetry Events:    MEDICATIONS  (STANDING):  chlorhexidine 2% Cloths 1 Application(s) Topical daily  folic acid 1 milliGRAM(s) Oral daily  heparin  Infusion 900 Unit(s)/Hr (9 mL/Hr) IV Continuous <Continuous>  influenza   Vaccine 0.5 milliLiter(s) IntraMuscular once  multivitamin 1 Tablet(s) Oral daily  thiamine 100 milliGRAM(s) Oral daily    MEDICATIONS  (PRN):  ALPRAZolam 0.25 milliGRAM(s) Oral at bedtime PRN anxiety  LORazepam     Tablet 2 milliGRAM(s) Oral every 2 hours PRN Symptom-triggered 2 point increase in CIWA-Ar  LORazepam   Injectable 2 milliGRAM(s) IV Push every 1 hour PRN Symptom-triggered: each CIWA -Ar score 8 or GREATER  LORazepam   Injectable 2 milliGRAM(s) IV Push every 2 hours PRN CIWA-Ar score increase by 2 points and a total score of 7 or less  LORazepam   Injectable 2 milliGRAM(s) IV Push every 1 hour PRN CIWA-Ar score 8 or greater      PHYSICAL EXAM:  GENERAL:   HEAD:  Atraumatic, Normocephalic  EYES: EOMI, PERRLA, conjunctiva and sclera clear  NECK: Supple, No JVD, Normal thyroid, no enlarged nodes  NERVOUS SYSTEM:  Alert & Awake.   CHEST/LUNG: B/L good air entry; No rales, rhonchi, or wheezing  HEART: S1S2 normal, no S3, Regular rate and rhythm; No murmurs  ABDOMEN: Soft, Nontender, Nondistended; Bowel sounds present  EXTREMITIES:  2+ Peripheral Pulses, No clubbing, cyanosis, or edema  LYMPH: No lymphadenopathy noted  SKIN: No rashes or lesions    LABS:                        12.4   7.40  )-----------( 156      ( 21 Oct 2021 04:24 )             38.4     10-21    138  |  106  |  12  ----------------------------<  125<H>  3.7   |  18<L>  |  <0.30<L>    Ca    8.6      21 Oct 2021 04:24  Phos  3.6     10-21  Mg     2.2     10-21    TPro  6.8  /  Alb  3.7  /  TBili  0.3  /  DBili  x   /  AST  23  /  ALT  32  /  AlkPhos  68  10-20    LIVER FUNCTIONS - ( 20 Oct 2021 22:13 )  Alb: 3.7 g/dL / Pro: 6.8 g/dL / ALK PHOS: 68 U/L / ALT: 32 U/L / AST: 23 U/L / GGT: x           PT/INR - ( 21 Oct 2021 04:24 )   PT: 10.8 sec;   INR: 0.90 ratio         PTT - ( 21 Oct 2021 04:24 )  PTT:60.1 sec  CAPILLARY BLOOD GLUCOSE          RADIOLOGY & ADDITIONAL TESTS:  CXR: 10/20 - clear lungs  CTA 10/20     IMPRESSION:  No pulmonary embolism.    4 mm right lower lobe subpleural nodule, unchanged from 2008.        Care Discussed with Consultants/Other Providers [ x] YES  [ ] NO        
Patient is a 60y old  Female who presents with a chief complaint of AFib with RVR, sinus pauses (21 Oct 2021 14:29)      SUBJECTIVE / OVERNIGHT EVENTS: Comfortable   Review of Systems  chest pain no  palpitations no  sob no  nausea no  headache no    MEDICATIONS  (STANDING):  chlorhexidine 2% Cloths 1 Application(s) Topical daily  folic acid 1 milliGRAM(s) Oral daily  multivitamin 1 Tablet(s) Oral daily  pantoprazole    Tablet 40 milliGRAM(s) Oral before breakfast  rivaroxaban 20 milliGRAM(s) Oral with dinner  thiamine 100 milliGRAM(s) Oral daily    MEDICATIONS  (PRN):  ALPRAZolam 0.25 milliGRAM(s) Oral at bedtime PRN anxiety  LORazepam     Tablet 2 milliGRAM(s) Oral every 2 hours PRN Symptom-triggered 2 point increase in CIWA-Ar  LORazepam   Injectable 2 milliGRAM(s) IV Push every 1 hour PRN Symptom-triggered: each CIWA -Ar score 8 or GREATER  LORazepam   Injectable 2 milliGRAM(s) IV Push every 2 hours PRN CIWA-Ar score increase by 2 points and a total score of 7 or less  LORazepam   Injectable 2 milliGRAM(s) IV Push every 1 hour PRN CIWA-Ar score 8 or greater      Vital Signs Last 24 Hrs  T(C): 36.5 (21 Oct 2021 14:00), Max: 36.8 (20 Oct 2021 19:19)  T(F): 97.7 (21 Oct 2021 14:00), Max: 98.3 (20 Oct 2021 19:19)  HR: 76 (21 Oct 2021 16:00) (74 - 155)  BP: 93/68 (21 Oct 2021 16:00) (93/68 - 131/87)  BP(mean): 77 (21 Oct 2021 16:00) (77 - 115)  RR: 16 (21 Oct 2021 16:00) (13 - 98)  SpO2: 100% (21 Oct 2021 16:00) (96% - 100%)    PHYSICAL EXAM:  GENERAL: NAD, well-developed  HEAD:  Atraumatic, Normocephalic  EYES: EOMI, PERRLA, conjunctiva and sclera clear  NECK: Supple, No JVD  CHEST/LUNG: Clear to auscultation bilaterally; No wheeze  HEART: Regular rate and rhythm; No murmurs, rubs, or gallops  ABDOMEN: Soft, Nontender, Nondistended; Bowel sounds present  EXTREMITIES:  2+ Peripheral Pulses, No clubbing, cyanosis, or edema  PSYCH: AAOx3  NEUROLOGY: non-focal  SKIN: No rashes or lesions    LABS:                        12.4   7.40  )-----------( 156      ( 21 Oct 2021 04:24 )             38.4     10-21    138  |  106  |  12  ----------------------------<  125<H>  3.7   |  18<L>  |  <0.30<L>    Ca    8.6      21 Oct 2021 04:24  Phos  3.6     10-21  Mg     2.2     10-21    TPro  6.8  /  Alb  3.7  /  TBili  0.3  /  DBili  x   /  AST  23  /  ALT  32  /  AlkPhos  68  10-20    PT/INR - ( 21 Oct 2021 04:24 )   PT: 10.8 sec;   INR: 0.90 ratio         PTT - ( 21 Oct 2021 04:24 )  PTT:60.1 sec            RADIOLOGY & ADDITIONAL TESTS:    Imaging Personally Reviewed:    Consultant(s) Notes Reviewed:      Care Discussed with Consultants/Other Providers:

## 2021-10-21 NOTE — PROGRESS NOTE ADULT - ASSESSMENT
60 year old F with PMHx of hypothyroid, Behcet's disease, COVID-19 presents to the ED with chief complaint of lightheadedness and palpitations. She woke up in the morning feeling well, then developed lightheadedness shortly after. She endorses two episodes of dizziness associated with palpitations, which prompted her visit to see her PCP. Found to be in AFib with RVR and was sent to the ER.  Of note, the patient had a similar episode of Afib 1-2 years go after having COVID. She was started on Metoprolol and Eliquis which was d/c'ed 2 weeks later after self-conversion to NSR. Denies CP, SOB, abd pain, NVD, dysuria. In ED, given 12mg diltiazem IV with improvement in rates to 120-130s. Shortly after, noted to have 4-5 pauses on telemetry each with a duration of 4-6 seconds. EP consulted for management.     1. AFib w/ RVR  2. Pauses    - Monitor on telemetry  - patient undergoing AF ablation  - CHADSVASC: 1, c/w anticoagulation for AFib  - f/u TSH   - Keep K>4.0 and Mg>2.0, replete as necessary. 60 year old F with PMHx of hypothyroid, Behcet's disease, COVID-19 presents to the ED with chief complaint of lightheadedness and palpitations. She woke up in the morning feeling well, then developed lightheadedness shortly after. She endorses two episodes of dizziness associated with palpitations, which prompted her visit to see her PCP. Found to be in AFib with RVR and was sent to the ER.  Of note, the patient had a similar episode of Afib 1-2 years go after having COVID. She was started on Metoprolol and Eliquis which was d/c'ed 2 weeks later after self-conversion to NSR. Denies CP, SOB, abd pain, NVD, dysuria. In ED, given 12mg diltiazem IV with improvement in rates to 120-130s. Shortly after, noted to have 4-5 pauses on telemetry each with a duration of 4-6 seconds. EP consulted for management.     1. AFib w/ RVR  2. Pauses    - Monitor on telemetry  - patient s/p AF ablation; patient should c/w AC x 1month and BioTel x 1month; patient counselled to avoid driving; patient also counselled to avoid strenuous exercise, straining, significant exertion x 1 week  - CHADSVASC: 1, c/w anticoagulation for AFib  - f/u TSH   - Keep K>4.0 and Mg>2.0, replete as necessary.

## 2021-10-21 NOTE — PROGRESS NOTE ADULT - ASSESSMENT
60 f with    A Fib with RVR and pauses/ Tachy Silviano syndrome  - telemetry  - s/p ablation   - AC with Heparin/ bridge to NOAC  - cardiology follow  - EP follow    Hypothyroid  - follow TSH    Behcet disese  - stable    Anxiety control    d/w patient     Jose Solorzano MD pager [phone]

## 2021-10-21 NOTE — DISCHARGE NOTE PROVIDER - HOSPITAL COURSE
60 year old F with PMHx of hypothyroid, Behcet's disease who presented to the ED with chief complaint of lightheadedness and palpitations on 10/20. She woke up in the morning feeling well, then started to develop lightheadedness a couple of hours later. She endorses two episodes of dizziness associated with palpitations, which prompted her visit to see her PMD. She was found to be in AFib with RVR and was sent to the ER. Of note, the patient had a similar episode of Afib 1-2 years ago, started on Metoprolol and Eliquis, however these were stopped 2 weeks later by her cardiologist in [facility] after she spontaneously converted back to NSR - at the time her AFib was believed to be cause by a recent viral infection.  Denies chest pain, SOB, abd pain, NVD, dysuria.  ER course: Patient was given 12 mg diltiazem with improvement in HR to 120s-130s. Patient was noted to have 4-5 documented pauses with each ranging from 4-6 seconds. Otherwise patient with afib in 140s-150s with reassuring BPs. Mag was ordered and patient ws placed on telemetry monitoring.  Patient admitted to CICU for ablation vs PM placement. On 10/21, pt had ablation via right groin access. VSS, groin Vascade closure site benign, no bleeding or hematoma. Plan to Start Xarelto with dinner, discharge home after 6pm if stable. pt has an appointment on 11/12 at 1pm with Dr. Morgan. 60 year old F with PMHx of hypothyroid, Behcet's disease who presented to the ED with chief complaint of lightheadedness and palpitations on 10/20. She woke up in the morning feeling well, then started to develop lightheadedness a couple of hours later. She endorses two episodes of dizziness associated with palpitations, which prompted her visit to see her PMD. She was found to be in AFib with RVR and was sent to the ER. Of note, the patient had a similar episode of Afib 1-2 years ago, started on Metoprolol and Eliquis, however these were stopped 2 weeks later by her cardiologist in [facility] after she spontaneously converted back to NSR - at the time her AFib was believed to be cause by a recent viral infection.  Denies chest pain, SOB, abd pain, NVD, dysuria.  ER course: Patient was given 12 mg diltiazem with improvement in HR to 120s-130s. Patient was noted to have 4-5 documented pauses with each ranging from 4-6 seconds. Otherwise patient with afib in 140s-150s with reassuring BPs. Mag was ordered and patient ws placed on telemetry monitoring.  Patient admitted to CICU for ablation vs PM placement. On 10/21, pt had ablation via right groin access. VSS, groin Vascade closure site benign, no bleeding or hematoma. Plan to Start Xarelto with dinner, discharge home after 6pm if stable. pt was seen by EP team, Biotel Monitor will be placed on pt's chest then discharged home. pt has an appointment on 11/12 at 1pm with Dr. Morgan.

## 2021-10-21 NOTE — DISCHARGE NOTE PROVIDER - NSDCCPCAREPLAN_GEN_ALL_CORE_FT
PRINCIPAL DISCHARGE DIAGNOSIS  Diagnosis: Rapid atrial fibrillation  Assessment and Plan of Treatment: Continue with your cardiologist and primary care MD. Continue your current medications. Call your physician for palpitations, feelings of rapid heart beat, lightheadedness, or dizziness. Do not stop taking Xarelto without cardiologists's permission. Watch for bleeding. Follow up with cardiologist or Dr. Morgan as scheduled.      SECONDARY DISCHARGE DIAGNOSES  Diagnosis: Sinus pause  Assessment and Plan of Treatment: Monitor your symotom; dizziness, nausea,light headednes, fatigue. Report you PCP/cardiologist or go to ER if you feel like to passing out.    Diagnosis: Behcet's disease  Assessment and Plan of Treatment: Take medication as prescribed, f/u with PCP. Continue Healthy life style: eat healthy and exercise.

## 2021-10-21 NOTE — CHART NOTE - NSCHARTNOTEFT_GEN_A_CORE
CICU Transfer Note    Transfer from: CICU    Transfer to: (  ) Medicine    ( x ) Telemetry     (   ) RCU        (    ) Palliative         (   ) Stroke Unit          (   ) __________________    Accepting Physician:  Signout given to:     HPI/CICU COURSE:    60 year old F with PMHx of hypothyroid, Behcet's disease who presented to the ED with chief complaint of lightheadedness and palpitations. She woke up in the morning feeling well, then started to develop lightheadedness a couple of hours later. She endorses two episodes of dizziness associated with palpitations, which prompted her visit to see her PMD. She was found to be in AFib with RVR and was sent to the ER.  Of note, the patient had a similar episode of Afib 1-2 years go. She had been initially started on Metoprolol and Eliquis, however these were stopped 2 weeks later by her cardiologist in [facility] after she spontaneously converted back to NSR - at the time her AFib was believed to be cause by a recent viral infection.  Denies Cp, SOB, abd pain, NVD, dysuria.    ER course: Patient was given 12 mg diltiazem with improvement in HR to 120s-130s. Patient was noted to have 4-5 documented pauses with each ranging from 4-6 seconds, symptomatic with dizziness/palpitations. Otherwise patient with afib in 140s-150s with reassuring BPs. Mag was ordered and patient ws placed on telemetry monitoring.  Patient transferred to CICU for ablation vs PM placement.     10/21 - patient undergoing AF ablation      ASSESSMENT & PLAN:     60 year old F with PMHx of hypothyroid, Behcet's disease who presented to the ED with chief complaint of lightheadedness and palpitations, found to be in AFib with RVR with conversion pauses after receiving diltiazem, transferred to CICU now pending ablation vs PPM.     #Cardiovascular  Afib w/ RVR and conversion pauses/sinus pauses.   - unclear etiology: possibly from EtOH use. Will check TSH and electrolytes - electrolytes wnl, TSH pending  - s/p IV diltiazem in ED with cardioversion to NSR accompanied by conversion pause of 6 seconds. Having sinus pauses 4s-7s afterwards intermittently with symptoms.  - Discussed with Dr. Morgan (EP): hold off on transvenous pacemaker or rate control meds overnight.  - telemetry  - Transcutaneous pacer pads on patient.  - 10/21 Afib ablation in the AM. NPO since MN, COVID, pre-op labs.  - EP following.    #Neuro  AOx3, no acute issues  Patient w/ EtOH use. Will send Utox and EtoH level.   Folic acid, thiamine, MVI.  PRN ativan, xanax    #Respiratory  Hx Behcet disease  - Pulmonary CTA to eval for PE - neg for PE  - Sating well RA, goal SpO2 > 92%    #GI/Nutrition  - no active issue.  - NPO - can resume normal diet after ablation procedure    #/Renal  - No acute issues  - Trend I/Os, monitor UOP    #Hem-Onc  - heparin gtt    #Endo  Hypothyroid  No acute issues     #ID  No acute issues  - Trend WBC, fevers    Disposition: [facility]        FOR FOLLOW UP:  - Follow up with EP for heparin gtt continuation  - Follow up with patient for home thyroid meds CICU Transfer Note    Transfer from: CICU    Transfer to: (  ) Medicine    ( x ) Telemetry     (   ) RCU        (    ) Palliative         (   ) Stroke Unit          (   ) __________________    Accepting Physician: Dr. ken villarreal   Signout given to: hospitalsU ACP and MAR    [facility]/CICU COURSE:    60 year old F with PMHx of hypothyroid, Behcet's disease who presented to the ED with chief complaint of lightheadedness and palpitations. She woke up in the morning feeling well, then started to develop lightheadedness a couple of hours later. She endorses two episodes of dizziness associated with palpitations, which prompted her visit to see her PMD. She was found to be in AFib with RVR and was sent to the ER.  Of note, the patient had a similar episode of Afib 1-2 years go. She had been initially started on Metoprolol and Eliquis, however these were stopped 2 weeks later by her cardiologist in [facility] after she spontaneously converted back to NSR - at the time her AFib was believed to be cause by a recent viral infection.  Denies Cp, SOB, abd pain, NVD, dysuria.    ER course: Patient was given 12 mg diltiazem with improvement in HR to 120s-130s. Patient was noted to have 4-5 documented pauses with each ranging from 4-6 seconds, symptomatic with dizziness/palpitations. Otherwise patient with afib in 140s-150s with reassuring BPs. Mag was ordered and patient ws placed on telemetry monitoring.  Patient transferred to CICU for ablation vs PM placement.     10/21 - patient undergoing AF ablation      ASSESSMENT & PLAN:     60 year old F with PMHx of hypothyroid, Behcet's disease who presented to the ED with chief complaint of lightheadedness and palpitations, found to be in AFib with RVR with conversion pauses after receiving diltiazem, transferred to CICU now pending ablation vs PPM.     #Cardiovascular  Afib w/ RVR and conversion pauses/sinus pauses.   - unclear etiology: possibly from EtOH use. Will check TSH and electrolytes - electrolytes wnl, TSH pending  - s/p IV diltiazem in ED with cardioversion to NSR accompanied by conversion pause of 6 seconds. Having sinus pauses 4s-7s afterwards intermittently with symptoms.  - Discussed with Dr. Morgan (EP): hold off on transvenous pacemaker or rate control meds overnight.  - telemetry  - Transcutaneous pacer pads on patient.  - 10/21 Afib ablation in the AM. NPO since MN, COVID, pre-op labs.  - EP following.    #Neuro  AOx3, no acute issues  Patient w/ EtOH use. Will send Utox and EtoH level.   Folic acid, thiamine, MVI.  PRN ativan, xanax    #Respiratory  Hx Behcet disease  - Pulmonary CTA to eval for PE - neg for PE  - Sating well RA, goal SpO2 > 92%    #GI/Nutrition  - no active issue.  - NPO - can resume normal diet after ablation procedure    #/Renal  - No acute issues  - Trend I/Os, monitor UOP    #Hem-Onc  - heparin gtt    #Endo  Hypothyroid  No acute issues     #ID  No acute issues  - Trend WBC, fevers    Disposition: [facility]        FOR FOLLOW UP:  - Follow up with EP for heparin gtt continuation  - Follow up with patient for home thyroid meds leukocytosis from steroids

## 2021-10-21 NOTE — DISCHARGE NOTE PROVIDER - NSDCMRMEDTOKEN_GEN_ALL_CORE_FT
calcium:   folic acid 1 mg oral tablet: 1 tab(s) orally once a day  liothyronine 50 mcg oral tablet: 1 tab(s) orally once a day  note: was 2.5 tabs daily, but dose recently decreased  multivitamin:   pantoprazole 40 mg oral delayed release tablet: 1 tab(s) orally once a day (before a meal)  Propecia 1 mg oral tablet: 1 tab(s) orally once a day  rivaroxaban 20 mg oral tablet: 1 tab(s) orally once a day (before a meal)  spironolactone 100 mg oral tablet: 1 tab(s) orally once a day  Synthroid 175 mcg (0.175 mg) oral tablet: 1 tab(s) orally once a day  thiamine 100 mg oral tablet: 1 tab(s) orally once a day  Vitamin D3 drops:

## 2021-10-21 NOTE — CHART NOTE - NSCHARTNOTEFT_GEN_A_CORE
ABHIJIT FINKHN  [id number]    PROCEDURE:  Atrial fibrillation ablation    INDICATION:  Symptomatic afib    ELECTROPHYSIOLOGIST(S):  MD David Garcia MD (fellow)    ANESTHESIOLOGY:  GA    FINDINGS:  Successful afib ablation PVI with parasympathetic ganglion ablation    COMPLICATIONS:  None    RECOMMENDATIONS:  Continue with AC  Bedrest 3 hours  Discharge home today

## 2021-10-21 NOTE — CHART NOTE - NSCHARTNOTEFT_GEN_A_CORE
s/p A-fib ablation via right groin  VSS, Rt Groin Vascade x2 site: benign, No hematoma or ecchymosis  Tele SR 70's     Plan" continue to monitor, OOB after 4pm  Continue Xarelto with dinner  Continue current medication    F/u with EP office with Dr. Morgan in 2-3 weeks s/p A-fib ablation via right groin  VSS, Rt Groin Vascade x2 site: benign, No hematoma or ecchymosis  Tele SR 70's     Plan" continue to monitor, OOB after 4pm  Continue Xarelto with dinner  Continue current medication    F/u with EP office with Dr. Morgan on 11/12/21 @1pm

## 2021-10-22 ENCOUNTER — NON-APPOINTMENT (OUTPATIENT)
Age: 61
End: 2021-10-22

## 2021-10-23 ENCOUNTER — EMERGENCY (EMERGENCY)
Facility: HOSPITAL | Age: 61
LOS: 1 days | Discharge: HOME | End: 2021-10-23
Attending: EMERGENCY MEDICINE
Payer: COMMERCIAL

## 2021-10-23 VITALS
RESPIRATION RATE: 18 BRPM | OXYGEN SATURATION: 97 % | TEMPERATURE: 99 F | SYSTOLIC BLOOD PRESSURE: 119 MMHG | DIASTOLIC BLOOD PRESSURE: 75 MMHG | HEART RATE: 90 BPM

## 2021-10-23 VITALS
TEMPERATURE: 99 F | RESPIRATION RATE: 22 BRPM | HEART RATE: 96 BPM | WEIGHT: 102.07 LBS | DIASTOLIC BLOOD PRESSURE: 81 MMHG | OXYGEN SATURATION: 95 % | SYSTOLIC BLOOD PRESSURE: 122 MMHG | HEIGHT: 61 IN

## 2021-10-23 PROBLEM — I48.91 UNSPECIFIED ATRIAL FIBRILLATION: Chronic | Status: ACTIVE | Noted: 2021-10-20

## 2021-10-23 LAB
ALBUMIN SERPL ELPH-MCNC: 3.9 G/DL — SIGNIFICANT CHANGE UP (ref 3.3–5)
ALP SERPL-CCNC: 99 U/L — SIGNIFICANT CHANGE UP (ref 40–120)
ALT FLD-CCNC: 106 U/L — HIGH (ref 10–45)
ANION GAP SERPL CALC-SCNC: 16 MMOL/L — SIGNIFICANT CHANGE UP (ref 5–17)
AST SERPL-CCNC: 105 U/L — HIGH (ref 10–40)
BASOPHILS # BLD AUTO: 0.02 K/UL — SIGNIFICANT CHANGE UP (ref 0–0.2)
BASOPHILS NFR BLD AUTO: 0.2 % — SIGNIFICANT CHANGE UP (ref 0–2)
BILIRUB SERPL-MCNC: 0.4 MG/DL — SIGNIFICANT CHANGE UP (ref 0.2–1.2)
BUN SERPL-MCNC: 7 MG/DL — SIGNIFICANT CHANGE UP (ref 7–23)
CALCIUM SERPL-MCNC: 9 MG/DL — SIGNIFICANT CHANGE UP (ref 8.4–10.5)
CHLORIDE SERPL-SCNC: 104 MMOL/L — SIGNIFICANT CHANGE UP (ref 96–108)
CO2 SERPL-SCNC: 19 MMOL/L — LOW (ref 22–31)
CREAT SERPL-MCNC: 0.32 MG/DL — LOW (ref 0.5–1.3)
EOSINOPHIL # BLD AUTO: 0.02 K/UL — SIGNIFICANT CHANGE UP (ref 0–0.5)
EOSINOPHIL NFR BLD AUTO: 0.2 % — SIGNIFICANT CHANGE UP (ref 0–6)
GLUCOSE SERPL-MCNC: 98 MG/DL — SIGNIFICANT CHANGE UP (ref 70–99)
HCT VFR BLD CALC: 38.1 % — SIGNIFICANT CHANGE UP (ref 34.5–45)
HGB BLD-MCNC: 12.3 G/DL — SIGNIFICANT CHANGE UP (ref 11.5–15.5)
IMM GRANULOCYTES NFR BLD AUTO: 0.3 % — SIGNIFICANT CHANGE UP (ref 0–1.5)
LYMPHOCYTES # BLD AUTO: 1.99 K/UL — SIGNIFICANT CHANGE UP (ref 1–3.3)
LYMPHOCYTES # BLD AUTO: 20 % — SIGNIFICANT CHANGE UP (ref 13–44)
MAGNESIUM SERPL-MCNC: 1.8 MG/DL — SIGNIFICANT CHANGE UP (ref 1.6–2.6)
MCHC RBC-ENTMCNC: 27.6 PG — SIGNIFICANT CHANGE UP (ref 27–34)
MCHC RBC-ENTMCNC: 32.3 GM/DL — SIGNIFICANT CHANGE UP (ref 32–36)
MCV RBC AUTO: 85.6 FL — SIGNIFICANT CHANGE UP (ref 80–100)
MONOCYTES # BLD AUTO: 0.87 K/UL — SIGNIFICANT CHANGE UP (ref 0–0.9)
MONOCYTES NFR BLD AUTO: 8.8 % — SIGNIFICANT CHANGE UP (ref 2–14)
NEUTROPHILS # BLD AUTO: 7.01 K/UL — SIGNIFICANT CHANGE UP (ref 1.8–7.4)
NEUTROPHILS NFR BLD AUTO: 70.5 % — SIGNIFICANT CHANGE UP (ref 43–77)
NRBC # BLD: 0 /100 WBCS — SIGNIFICANT CHANGE UP (ref 0–0)
PLATELET # BLD AUTO: 159 K/UL — SIGNIFICANT CHANGE UP (ref 150–400)
POTASSIUM SERPL-MCNC: 3.8 MMOL/L — SIGNIFICANT CHANGE UP (ref 3.5–5.3)
POTASSIUM SERPL-SCNC: 3.8 MMOL/L — SIGNIFICANT CHANGE UP (ref 3.5–5.3)
PROT SERPL-MCNC: 6.8 G/DL — SIGNIFICANT CHANGE UP (ref 6–8.3)
RAPID RVP RESULT: SIGNIFICANT CHANGE UP
RBC # BLD: 4.45 M/UL — SIGNIFICANT CHANGE UP (ref 3.8–5.2)
RBC # FLD: 12.9 % — SIGNIFICANT CHANGE UP (ref 10.3–14.5)
SARS-COV-2 RNA SPEC QL NAA+PROBE: SIGNIFICANT CHANGE UP
SODIUM SERPL-SCNC: 139 MMOL/L — SIGNIFICANT CHANGE UP (ref 135–145)
TROPONIN T, HIGH SENSITIVITY RESULT: 365 NG/L — HIGH (ref 0–51)
TROPONIN T, HIGH SENSITIVITY RESULT: 436 NG/L — HIGH (ref 0–51)
TSH SERPL-MCNC: <0.01 UIU/ML — LOW (ref 0.27–4.2)
TSH SERPL-MCNC: <0.01 UIU/ML — LOW (ref 0.27–4.2)
WBC # BLD: 9.94 K/UL — SIGNIFICANT CHANGE UP (ref 3.8–10.5)
WBC # FLD AUTO: 9.94 K/UL — SIGNIFICANT CHANGE UP (ref 3.8–10.5)

## 2021-10-23 PROCEDURE — 99284 EMERGENCY DEPT VISIT MOD MDM: CPT | Mod: 25

## 2021-10-23 PROCEDURE — 84484 ASSAY OF TROPONIN QUANT: CPT

## 2021-10-23 PROCEDURE — 85025 COMPLETE CBC W/AUTO DIFF WBC: CPT

## 2021-10-23 PROCEDURE — 93005 ELECTROCARDIOGRAM TRACING: CPT

## 2021-10-23 PROCEDURE — 93010 ELECTROCARDIOGRAM REPORT: CPT | Mod: 59

## 2021-10-23 PROCEDURE — 99285 EMERGENCY DEPT VISIT HI MDM: CPT

## 2021-10-23 PROCEDURE — 71046 X-RAY EXAM CHEST 2 VIEWS: CPT | Mod: 26

## 2021-10-23 PROCEDURE — 80053 COMPREHEN METABOLIC PANEL: CPT

## 2021-10-23 PROCEDURE — 0225U NFCT DS DNA&RNA 21 SARSCOV2: CPT

## 2021-10-23 PROCEDURE — 71046 X-RAY EXAM CHEST 2 VIEWS: CPT

## 2021-10-23 PROCEDURE — 83735 ASSAY OF MAGNESIUM: CPT

## 2021-10-23 PROCEDURE — 84443 ASSAY THYROID STIM HORMONE: CPT

## 2021-10-23 NOTE — ED PROVIDER NOTE - NS ED ROS FT
GENERAL: positive for subjective fever  EYES: no eye pain  HEENT: no neck pain  CARDIAC: no chest pain  PULMONARY: positive for sob, cough, see hpi  GI: no abdominal pain  : no dysuria  SKIN: no rashes  NEURO: no headache  MSK: no new joint pain

## 2021-10-23 NOTE — ED PROVIDER NOTE - NSFOLLOWUPINSTRUCTIONS_ED_ALL_ED_FT
1) Follow up with Dr. Fabian Smith, your cardiologist, within 1 week.   2) Return to the ED immediately for new or worsening symptoms   3) Please continue to take any home medications as prescribed  4) Your test results from your ED visit were discussed with you prior to discharge  5) You were provided with a copy of your test results 1) Follow up with Dr. Fabian Smith, your cardiologist, within 1 week.   2) Return to the ED immediately for new or worsening symptoms   3) Please continue to take any home medications as prescribed  4) Your test results from your ED visit were discussed with you prior to discharge  5) You were provided with a copy of your test results  6) Follow up with your PMD to have your transaminase levels re-measured

## 2021-10-23 NOTE — ED PROVIDER NOTE - PROGRESS NOTE DETAILS
Danilo Tidwell M.D. (PGY-1): Troponin elevated at 436, sent repeat to trend. likely 2/2 to recent ablation. cxr = small bilateral pleural effusion per radiology. remaining work up = unremarkable, ecg stable. Spoke w/ Dr. Smith regarding presentation and work. does not feel that urgent echo is indicated. he is comfortable w/ seeing pt in the outpatient setting pending remaining work up. Danilo Tidwell M.D. (PGY-1): Repeat troponin negative. Findings from work up discussed w/ pt. At this time, low suspicion for critical pathology. Pt asking to leave. Plan = discharge w/ close follow up w/ Dr. Smith, pt's cardiologist, for possible echo to r/out pericarditis/pleural effusion following cardiac procedure. I asked that she call Dr. Smith office as soon as she can. pt happy and agreeable w/ plan. Danilo Tidwell M.D. (PGY-1): Troponin elevated at 436, sent repeat to trend. likely 2/2 to recent ablation. cxr = small bilateral pleural effusion per radiology. non-ag acidosis, suspect mild dehydration. acute transaminitis, non-specific. remaining work up = unremarkable, ecg stable. Spoke w/ Dr. Smith regarding presentation and work. does not feel that urgent echo is indicated. he is comfortable w/ seeing pt in the outpatient setting pending remaining work up. Danilo Tidwell M.D. (PGY-1): Repeat troponin negative. Findings from work up discussed w/ pt. At this time, low suspicion for critical pathology. Pt asking to leave. Plan = discharge w/ close follow up w/ Dr. Smith, pt's cardiologist, for possible echo to r/out pericarditis/pleural effusion following cardiac procedure. I asked that she call Dr. Smith office as soon as she can. asked pt to follow up w/ her PCP for her transaminitis. pt happy and agreeable w/ plan.

## 2021-10-23 NOTE — ED ADULT NURSE NOTE - OBJECTIVE STATEMENT
patient is a 62 y/o F with hx of afib s/p ablation 2 days ago, Behcet's disease, and hypothyroidism who presents to the ED c/o fever, sore throat, cough, and SOB that started earlier today. patient states that this started suddenly while walking up a flight of stairs, however denies CP at this time. patient also endorsing green/yellow sputum when she coughs which started today as well. patient states that she felt "feverish" at home however did not take her temperature.   patient is a&ox4, PERRL. strong peripheral pulses, strong extremities x4. ambulatory independently with steady gait. respirations even and unlabored with symmetrical chest rise. abdomen soft, nondistended. skin intact. patient denies h/a, dizziness, weakness, numbness/tingling, vision changes, abd pain, n/v/d/c

## 2021-10-23 NOTE — ED PROVIDER NOTE - ATTENDING CONTRIBUTION TO CARE
attending Lili: 61yF with recent Afib ablation p/w cough and SOB. benign exam. Will obtain ekg including trop, cxr, discuss with cardiology, reassess

## 2021-10-23 NOTE — ED ADULT NURSE NOTE - CAS TRG GEN SKIN CONDITION
D/c in Medt and says she's had a couple of episodes. She did use clicker once in last few weeks.  Tried to have her send in manual however, the battery in the reader did not have enough juice. Asked her to let it charge up and try again in 20 minutes.  If it keeps beeping low battery, may need new transmitter. She will call either way after attempting the next reading.   Warm/Dry

## 2021-10-23 NOTE — ED PROVIDER NOTE - PATIENT PORTAL LINK FT
You can access the FollowMyHealth Patient Portal offered by [facility] by registering at the following website: [email or web address]. By joining [employer]’s FollowMyHealth portal, you will also be able to view your health information using other applications (apps) compatible with our system.

## 2021-10-23 NOTE — ED ADULT NURSE NOTE - CAS ELECT INFOMATION PROVIDED
Patient d/c home w/ written and verbal instructions. Pt verbalized understanding. IV d/c - No redness or swelling.

## 2021-10-23 NOTE — ED PROVIDER NOTE - PHYSICAL EXAMINATION
Gen: NAD, non-toxic appearing  Head: normal appearing  HEENT: normal conjunctiva  Lung: no respiratory distress, speaking in full sentences, clear lung sounds at the bases  CV: regular rate and rhythm, no murmurs  Abd: soft, non distended, non tender   MSK: no visible deformities, no leg swelling, no tenderness when the calves or thighs are palpated.  Neuro: alert and grossly oriented, no gross motor deficits  Skin: No matt rashes

## 2021-10-23 NOTE — ED PROVIDER NOTE - CLINICAL SUMMARY MEDICAL DECISION MAKING FREE TEXT BOX
60 yo F s/p recent intubation and a fib ablation presenting w/ sob and associated cough. VSS. non-toxic appearing and benign exam. Dxd = pnx, 62 yo F s/p recent intubation and a fib ablation presenting w/ sob and associated cough. VSS. non-toxic appearing and benign exam. consider pnx, viral process. also consider cardiogenic dyspnea given worsening w/ exertion and orthopnea. will assess for primary cardiac injury/acute rhythm change. Pt w/ normal HR and wells score of 0-1, low suspicion PE. Plan = EKG, CMP/Mg, CBC, Troponin, CXR, rvp.

## 2021-10-23 NOTE — ED PROVIDER NOTE - OBJECTIVE STATEMENT
Triage note says cc =fever. Pt reported that chief reason for visit = sob and cough.     62 yo F w/ a PMHX of A fib s/p ablation done 2 days ago, Behcet's dx not immuno suppressed and hypothyroidism who presents w/ SOB.  SOB starting earlier today. Worsened w/ walking up stairs and while laying down.   No acute events prior to onset.   Associated cough w/ green/yellow sputum starting y/o. Felt subjective/chills a few hours PTA, prompting arrival here. No associated chest pain, palpitations, LH, LOC, n/v, or rash.     Pt concerned that she has aspirational infection associated w/ her recent intubation for her A fib.   No recent exposure to allergies, denies known aspiration, denies trauma.  No known hx of lung disease. No hx of tobacco use.

## 2021-10-23 NOTE — ED ADULT NURSE NOTE - PAIN: PRESENCE, MLM
Patient up in chair much of the day. Denying any discomfort. Oriented x4. Cooperative with staff. Plan to return to assisted living this evening at 1730 with [facility] to transport. Yfn Rojas updated to the plan and in agreement.   denies pain/discomfort

## 2021-10-24 NOTE — ED POST DISCHARGE NOTE - DETAILS
10/24: patient with hypothyroid, recommend pmd follow up for remainder of tfts being drawn - Tiffany Hair PA-C

## 2021-10-25 ENCOUNTER — NON-APPOINTMENT (OUTPATIENT)
Age: 61
End: 2021-10-25

## 2021-10-25 ENCOUNTER — APPOINTMENT (OUTPATIENT)
Dept: CARDIOLOGY | Facility: CLINIC | Age: 61
End: 2021-10-25
Payer: COMMERCIAL

## 2021-10-25 VITALS
HEART RATE: 108 BPM | DIASTOLIC BLOOD PRESSURE: 91 MMHG | SYSTOLIC BLOOD PRESSURE: 137 MMHG | WEIGHT: 101 LBS | BODY MASS INDEX: 19.08 KG/M2 | OXYGEN SATURATION: 97 %

## 2021-10-25 PROCEDURE — 93000 ELECTROCARDIOGRAM COMPLETE: CPT

## 2021-10-25 PROCEDURE — 99214 OFFICE O/P EST MOD 30 MIN: CPT

## 2021-10-25 NOTE — HISTORY OF PRESENT ILLNESS
[FreeTextEntry1] : Loly is 61 years old who is overall healthy.  Sometime back she had an episode of atrial fibrillation and was placed on Eliquis but this resolved and noninvasive work-up was apparently negative and Eliquis was stopped.  She has a history of hypothyroidism and is on Cytomel and Synthroid.  She apparently does like to drink scotch\par \par She presented recently with new onset of atrial fibrillation at a rapid ventricular response palpitations and shortness of breath.  She was sent to the emergency room at [place].  Initially she was given some Cardizem and had long pauses and then reverted back to atrial fibrillation.  Later even without getting any AV ning blocking agent she had pauses.\par \par She was brought to the CCU at [place] where she was monitored and remained in A. fib with a rapid ventricular response all night echocardiogram in A. fib was difficult to read no gross abnormalities were noted no valve disease\par \par The next day she was taken to the EP lab.  She reverted spontaneously to sinus rhythm but nonetheless underwent an A. fib ablation.  She was returned to sinus rhythm and was discharged home.  She had felt well for the next day but on Friday night literally 3 days ago she felt short of breath anxious.  She came to the emergency room where her blood tests were unremarkable she remained in normal sinus rhythm her x-ray was unremarkable she had no fever and her oxygen saturation was normal she was sent home.\par \par The phlegm production of white sputum so to resolve but so to return back a little today and she felt short of breath and anxious and made an urgent appointment\par \par EKG today normal sinus rhythm sinus tachycardia otherwise within normal limits\par \par Presently she is anxious but does not appear short of breath

## 2021-10-25 NOTE — ASSESSMENT
[FreeTextEntry1] : Loly Shepard is status post recent A. fib ablation, she remains in sinus rhythm but has symptoms of phlegm production white shortness of breath and palpitations though she remains in normal sinus rhythm and a blood tests several days ago were relatively unremarkable.  The troponin was elevated but probably related to the A. fib ablation procedure.  Lungs are clear and O2 saturation is normal.  There is a strong anxiety component

## 2021-10-25 NOTE — PHYSICAL EXAM
[Well Developed] : well developed [Well Nourished] : well nourished [Normal Conjunctiva] : normal conjunctiva [Normal Venous Pressure] : normal venous pressure [Normal S1, S2] : normal S1, S2 [No Murmur] : no murmur [Clear Lung Fields] : clear lung fields [Soft] : abdomen soft [Normal Gait] : normal gait [No Edema] : no edema [de-identified] : Anxious and nervous heart rate 110-115 regular [de-identified] : No egophony

## 2021-10-25 NOTE — DISCUSSION/SUMMARY
[FreeTextEntry1] : 1.  I tried to reassure her that I did not feel there was any acute cardiac issue occurring.\par 2.  She is concerned that there is something wrong with her heart and I did try to reassure her that this was a good deal of anxiety that her blood test physical exam and EKG were unremarkable\par 3.  Start Toprol XL 25 which could be increased to 50\par 4.  If symptoms persist and perhaps blood work including sed rate D-dimer etc. as well as a repeat echocardiogram\par \par I will follow-up with her within 1 week.  She will also follow-up with Dr. Brown and Dr. Morgan

## 2021-10-25 NOTE — REASON FOR VISIT
[FreeTextEntry1] : Sang Lema 61 years old was seen urgently because of persistent white sputum production shortness of breath and anxiety now several days status post atrial fibrillation ablation

## 2021-10-27 ENCOUNTER — LABORATORY RESULT (OUTPATIENT)
Age: 61
End: 2021-10-27

## 2021-10-27 ENCOUNTER — APPOINTMENT (OUTPATIENT)
Dept: INTERNAL MEDICINE | Facility: CLINIC | Age: 61
End: 2021-10-27
Payer: COMMERCIAL

## 2021-10-27 VITALS
HEIGHT: 61 IN | WEIGHT: 102 LBS | SYSTOLIC BLOOD PRESSURE: 90 MMHG | BODY MASS INDEX: 19.26 KG/M2 | DIASTOLIC BLOOD PRESSURE: 60 MMHG

## 2021-10-27 DIAGNOSIS — R09.82 POSTNASAL DRIP: ICD-10-CM

## 2021-10-27 PROCEDURE — 99214 OFFICE O/P EST MOD 30 MIN: CPT | Mod: 25

## 2021-10-27 PROCEDURE — 36415 COLL VENOUS BLD VENIPUNCTURE: CPT

## 2021-10-27 NOTE — ASSESSMENT
[FreeTextEntry1] : Is a 61-year-old female whose history has been reviewed above\par \par She has a history of atrial fibrillation and Behcet's syndrome now complaining of a postnasal drip\par \par I explained that this may be allergic or it could be reflux.\par \par Her CT angiogram showed no pleural effusion and normal lungs and normal arteries\par \par I am concerned about her underlying Behcet's syndrome and will refer her to rheumatology\par \par In addition I will start her on Flonase\par \par Hospital records were reviewed she had a suppressed TSH and elevated liver enzymes I will repeat her blood test now.  Certainly elevated thyroid could be a contributing factor

## 2021-10-27 NOTE — HISTORY OF PRESENT ILLNESS
[FreeTextEntry8] : This is a 62-year-old female with a history of Behcet's syndrome.  She recently has had a episode of atrial fibrillation and when medicated had prolonged pauses.\par \par It is of interest that she has had several radiologic evaluations which intermittently show mild fluid her CT showed no evidence of fluid and chest x-ray done at that same time also showed no fluid\par \par Her problem today is nasal congestion a feeling of fluid in her throat.  She said it was much worse 3 days ago but is getting better\par \par It is also of interest that she has been put on a PPI

## 2021-10-27 NOTE — PHYSICAL EXAM
[Normal] : normal rate, regular rhythm, normal S1 and S2 and no murmur heard [de-identified] : No sinus tenderness [de-identified] : Sinus

## 2021-10-28 LAB
25(OH)D3 SERPL-MCNC: 47.2 NG/ML
ALBUMIN SERPL ELPH-MCNC: 4.2 G/DL
ALP BLD-CCNC: 90 U/L
ALT SERPL-CCNC: 40 U/L
ANION GAP SERPL CALC-SCNC: 13 MMOL/L
AST SERPL-CCNC: 18 U/L
BASOPHILS # BLD AUTO: 0.03 K/UL
BASOPHILS NFR BLD AUTO: 0.4 %
BILIRUB SERPL-MCNC: 0.2 MG/DL
BUN SERPL-MCNC: 13 MG/DL
CALCIUM SERPL-MCNC: 9.8 MG/DL
CHLORIDE SERPL-SCNC: 102 MMOL/L
CHOLEST SERPL-MCNC: 156 MG/DL
CO2 SERPL-SCNC: 22 MMOL/L
CREAT SERPL-MCNC: 0.34 MG/DL
EOSINOPHIL # BLD AUTO: 0.1 K/UL
EOSINOPHIL NFR BLD AUTO: 1.3 %
ESTIMATED AVERAGE GLUCOSE: 114 MG/DL
GLUCOSE SERPL-MCNC: 128 MG/DL
HBA1C MFR BLD HPLC: 5.6 %
HCT VFR BLD CALC: 44.5 %
HDLC SERPL-MCNC: 42 MG/DL
HGB BLD-MCNC: 14.4 G/DL
IMM GRANULOCYTES NFR BLD AUTO: 0.4 %
LDLC SERPL CALC-MCNC: 90 MG/DL
LYMPHOCYTES # BLD AUTO: 2.42 K/UL
LYMPHOCYTES NFR BLD AUTO: 32.1 %
MAN DIFF?: NORMAL
MCHC RBC-ENTMCNC: 28.2 PG
MCHC RBC-ENTMCNC: 32.4 GM/DL
MCV RBC AUTO: 87.1 FL
MONOCYTES # BLD AUTO: 0.81 K/UL
MONOCYTES NFR BLD AUTO: 10.8 %
NEUTROPHILS # BLD AUTO: 4.14 K/UL
NEUTROPHILS NFR BLD AUTO: 55 %
NONHDLC SERPL-MCNC: 114 MG/DL
PLATELET # BLD AUTO: 275 K/UL
POTASSIUM SERPL-SCNC: 4.7 MMOL/L
PROT SERPL-MCNC: 6.9 G/DL
RBC # BLD: 5.11 M/UL
RBC # FLD: 12.6 %
SODIUM SERPL-SCNC: 136 MMOL/L
T3RU NFR SERPL: 0.7 TBI
T4 SERPL-MCNC: 11.1 UG/DL
TRIGL SERPL-MCNC: 119 MG/DL
TSH SERPL-ACNC: <0.01 UIU/ML
URATE SERPL-MCNC: 5.9 MG/DL
WBC # FLD AUTO: 7.53 K/UL

## 2021-11-12 ENCOUNTER — APPOINTMENT (OUTPATIENT)
Dept: ELECTROPHYSIOLOGY | Facility: CLINIC | Age: 61
End: 2021-11-12
Payer: COMMERCIAL

## 2021-11-12 ENCOUNTER — NON-APPOINTMENT (OUTPATIENT)
Age: 61
End: 2021-11-12

## 2021-11-12 VITALS
HEIGHT: 61 IN | HEART RATE: 85 BPM | BODY MASS INDEX: 19.26 KG/M2 | SYSTOLIC BLOOD PRESSURE: 127 MMHG | OXYGEN SATURATION: 96 % | WEIGHT: 102 LBS | DIASTOLIC BLOOD PRESSURE: 86 MMHG

## 2021-11-12 DIAGNOSIS — R00.0 TACHYCARDIA, UNSPECIFIED: ICD-10-CM

## 2021-11-12 PROCEDURE — 93000 ELECTROCARDIOGRAM COMPLETE: CPT

## 2021-11-12 PROCEDURE — 99214 OFFICE O/P EST MOD 30 MIN: CPT

## 2021-11-12 NOTE — DISCUSSION/SUMMARY
[FreeTextEntry1] : She is doing quite well. She should probably stay on a low dose of a beta-blocker. She will see me on a prn basis. She will come off Xarelto at the end of the this month.ECG is normal

## 2021-11-12 NOTE — PHYSICAL EXAM
[Well Developed] : well developed [Well Nourished] : well nourished [No Acute Distress] : no acute distress [Normal Conjunctiva] : normal conjunctiva [Normal Venous Pressure] : normal venous pressure [No Carotid Bruit] : no carotid bruit [Normal S1, S2] : normal S1, S2 [No Murmur] : no murmur [No Rub] : no rub [No Gallop] : no gallop [Clear Lung Fields] : clear lung fields [No Respiratory Distress] : no respiratory distress  [Good Air Entry] : good air entry [Soft] : abdomen soft [Non Tender] : non-tender [No Masses/organomegaly] : no masses/organomegaly [Normal Bowel Sounds] : normal bowel sounds [Normal Gait] : normal gait [No Edema] : no edema [No Clubbing] : no clubbing [No Cyanosis] : no cyanosis [No Varicosities] : no varicosities [No Rash] : no rash [No Skin Lesions] : no skin lesions [Moves all extremities] : moves all extremities [No Focal Deficits] : no focal deficits [Normal Speech] : normal speech [Alert and Oriented] : alert and oriented [Normal memory] : normal memory [de-identified] : low BMI

## 2021-11-12 NOTE — HISTORY OF PRESENT ILLNESS
[FreeTextEntry1] : Loly is doing quite well post AF ablation. She had some 'Palpitations" post ablation which turned out to be sinus tachycardia at 110 bpm (high anxiety) and she was put back on low dose of beta-blocker and has done well. She has another few weeks of OAC therapy. NO chest pain, dizziness or palpitations. She is avoiding alcohol

## 2021-11-24 ENCOUNTER — APPOINTMENT (OUTPATIENT)
Dept: CARDIOLOGY | Facility: CLINIC | Age: 61
End: 2021-11-24
Payer: COMMERCIAL

## 2021-11-24 VITALS
HEART RATE: 84 BPM | HEIGHT: 61 IN | BODY MASS INDEX: 19.45 KG/M2 | OXYGEN SATURATION: 99 % | WEIGHT: 103 LBS | SYSTOLIC BLOOD PRESSURE: 116 MMHG | DIASTOLIC BLOOD PRESSURE: 76 MMHG

## 2021-11-24 DIAGNOSIS — F41.9 ANXIETY DISORDER, UNSPECIFIED: ICD-10-CM

## 2021-11-24 DIAGNOSIS — Z98.890 OTHER SPECIFIED POSTPROCEDURAL STATES: ICD-10-CM

## 2021-11-24 DIAGNOSIS — M35.2 BEHCET'S DISEASE: ICD-10-CM

## 2021-11-24 DIAGNOSIS — Z86.79 OTHER SPECIFIED POSTPROCEDURAL STATES: ICD-10-CM

## 2021-11-24 PROCEDURE — 99214 OFFICE O/P EST MOD 30 MIN: CPT

## 2021-11-24 NOTE — REASON FOR VISIT
[FreeTextEntry1] : Alvinanatalie Perkinshn 61 years old status post atrial fibrillation ablation here for follow-up.

## 2021-11-24 NOTE — DISCUSSION/SUMMARY
[FreeTextEntry1] : 1.  Continue Toprol-XL 25 and continue reduction of thyroid medication as per endocrine\par 2.  Increase her level of exercise\par 3.  No need for anticoagulation as she is a low [id number] l score\par 4.  Follow with me again in 4 months

## 2021-11-24 NOTE — HISTORY OF PRESENT ILLNESS
[FreeTextEntry1] : Loly is 61 years old who is overall healthy.  Sometime back she had an episode of atrial fibrillation and was placed on Eliquis but this resolved and noninvasive work-up was apparently negative and Eliquis was stopped.  She has a history of hypothyroidism and is on Cytomel and Synthroid.  She apparently does like to drink scotch\par \par She presented recently with new onset of atrial fibrillation at a rapid ventricular response palpitations and shortness of breath.  She was sent to the emergency room at [facility].  Initially she was given some Cardizem and had long pauses and then reverted back to atrial fibrillation.  Later even without getting any AV ning blocking agent she had pauses.\par \par She was brought to the CCU at [facility] where she was monitored and remained in A. fib with a rapid ventricular response all night echocardiogram in A. fib was difficult to read no gross abnormalities were not noted and there was no valve disease\par \par The next day she was taken to the EP lab.  She reverted spontaneously to sinus rhythm but nonetheless underwent an A. fib ablation.  She was returned to sinus rhythm and was discharged home.  She had felt well for the next day but on Friday night literally 3 days ago she felt short of breath anxious.  ER visit did not yield any abnormalities\par \par Since then, her anxiety level has decreased and she has less shortness of breath though when she goes up inclines she still feels some shortness of breath.  She has remained in normal sinus rhythm and is only on Toprol-XL 25\par \par She has remained in normal sinus rhythm.  Other than some shortness of breath up inclines she feels well.  She has no palpitations.\par \par Recent blood tests FTI 15.7 TSH less than 0.01 T4 11.1 LDL 90 BUN 13 creatinine 23 4\par Her thyroid medication are being slowly reduced\par \par Presently she is anxious but does not appear short of breath

## 2021-11-24 NOTE — PHYSICAL EXAM
[Well Developed] : well developed [Well Nourished] : well nourished [Normal Conjunctiva] : normal conjunctiva [Normal Venous Pressure] : normal venous pressure [Normal S1, S2] : normal S1, S2 [No Murmur] : no murmur [Clear Lung Fields] : clear lung fields [Soft] : abdomen soft [Normal Gait] : normal gait [No Edema] : no edema [de-identified] : Less anxious no acute distress looks well [de-identified] : No egophony

## 2021-11-24 NOTE — ASSESSMENT
[FreeTextEntry1] : Loly Lema  is status post recent A. fib ablation, she remains in sinus rhythm but has symptoms.  Her symptoms of shortness of breath have improved and she has no significant palpitations.  Her thyroid function is on the higher side and the dose of thyroid medication is being reduced.  A noninvasive work-up with echo was unremarkable overall she is clinically stable\par \par 1.  Paroxysmal atrial fibrillation status post ablation remains in normal sinus rhythm\par 2.  2D echo structurally normal heart no valvular disease\par 3.  History of hypothyroidism on thyroid replacement now thyroid function on the higher side dose being reduced\par 4.  Anxiety improved since the last visit\par \par Overall the patient is quite stable from a cardiac point of view.

## 2022-01-06 ENCOUNTER — APPOINTMENT (OUTPATIENT)
Dept: ORTHOPEDIC SURGERY | Facility: CLINIC | Age: 62
End: 2022-01-06
Payer: COMMERCIAL

## 2022-01-06 VITALS
HEIGHT: 61 IN | SYSTOLIC BLOOD PRESSURE: 114 MMHG | HEART RATE: 82 BPM | WEIGHT: 103 LBS | BODY MASS INDEX: 19.45 KG/M2 | DIASTOLIC BLOOD PRESSURE: 71 MMHG

## 2022-01-06 DIAGNOSIS — M51.36 OTHER INTERVERTEBRAL DISC DEGENERATION, LUMBAR REGION: ICD-10-CM

## 2022-01-06 PROCEDURE — 99204 OFFICE O/P NEW MOD 45 MIN: CPT

## 2022-01-06 PROCEDURE — 72100 X-RAY EXAM L-S SPINE 2/3 VWS: CPT

## 2022-01-06 NOTE — DISCUSSION/SUMMARY
[de-identified] : Left sacroiliitis.\par We discussed all options. \par All options discussed including rest, medicine, home exercise, acupuncture, Chiropractic care, Physical Therapy, Pain management, and last resort surgery. \par HEP brochure provided\par If no better, steroid injection.\par Anti-inflammatory medication p.r.n.\par All questions were answered, all alternatives discussed and the patient is in complete agreement with that plan. Follow-up appointment as instructed. Any issues and the patient will call or come in sooner.

## 2022-01-06 NOTE — PHYSICAL EXAM
[Normal] : Gait: normal [Salazar's Sign] : negative Salazar's sign [Pronator Drift] : negative pronator drift [SLR] : negative straight leg raise [de-identified] : 5 out of 5 motor strength, sensation is intact and symmetrical full range of motion flexion extension and rotation, no palpatory tenderness full range of motion of hips knees shoulders and elbows (all four extremities), no atrophy, negative straight leg raise, no pathological reflexes, no swelling, normal ambulation, no apparent distress skin is intact, no palpable lymph nodes, no upper or lower extremity instability, alert and oriented x3 and normal mood. Normal finger-to nose test. Pain over left SI joint. [de-identified] : X-ray done today L5/S1 Degenerative disc disease-reviewed with the patient.

## 2022-01-06 NOTE — HISTORY OF PRESENT ILLNESS
[Stable] : stable [de-identified] : Patient is a 61 year-old female who presents to the office today for initial evaluation of lower back pain. The patient works as an . The pain has been present since sitting on a 10 hour flight from [place]. It is located in the left lower side of her lower lumbosacral spine. She describes the pain as sharp in nature and it is exacerbated by laying down, or getting up from a laying or sitting position. She notes worsening pain when turning her waist to the left. Getting in an out of a car is okay. She did have one episode of left anterior thigh pain that resolved. She denies any other radicular symptoms. She denies any paresthesias in the lower extremities. She denies bowel or bladder dysfunction.\par She has been taking Advil which has been helpful. Heating pad has given some soothing relief, but that returned.\par Of note, she has seen some improvement in the last 24 hours because she had a massage yesterday. She has not tried any PT or Chiropractic care.\par No fever chills sweats nausea vomiting no bowel or bladder dysfunction, no recent weight loss or gain no night pain. This history is in addition to the intake form that I personally reviewed.

## 2022-01-15 ENCOUNTER — RX RENEWAL (OUTPATIENT)
Age: 62
End: 2022-01-15

## 2022-03-19 ENCOUNTER — APPOINTMENT (OUTPATIENT)
Dept: RHEUMATOLOGY | Facility: CLINIC | Age: 62
End: 2022-03-19
Payer: COMMERCIAL

## 2022-03-19 PROCEDURE — 96374 THER/PROPH/DIAG INJ IV PUSH: CPT

## 2022-04-11 PROBLEM — Z11.59 SCREENING FOR VIRAL DISEASE: Status: ACTIVE | Noted: 2020-06-02

## 2022-06-01 ENCOUNTER — APPOINTMENT (OUTPATIENT)
Dept: CARDIOLOGY | Facility: CLINIC | Age: 62
End: 2022-06-01
Payer: COMMERCIAL

## 2022-06-01 ENCOUNTER — NON-APPOINTMENT (OUTPATIENT)
Age: 62
End: 2022-06-01

## 2022-06-01 VITALS
WEIGHT: 105 LBS | HEART RATE: 82 BPM | RESPIRATION RATE: 17 BRPM | BODY MASS INDEX: 19.83 KG/M2 | DIASTOLIC BLOOD PRESSURE: 84 MMHG | SYSTOLIC BLOOD PRESSURE: 116 MMHG | OXYGEN SATURATION: 97 % | HEIGHT: 61 IN

## 2022-06-01 DIAGNOSIS — Z01.810 ENCOUNTER FOR PREPROCEDURAL CARDIOVASCULAR EXAMINATION: ICD-10-CM

## 2022-06-01 DIAGNOSIS — E03.9 HYPOTHYROIDISM, UNSPECIFIED: ICD-10-CM

## 2022-06-01 DIAGNOSIS — M54.50 LOW BACK PAIN, UNSPECIFIED: ICD-10-CM

## 2022-06-01 PROCEDURE — 99214 OFFICE O/P EST MOD 30 MIN: CPT

## 2022-06-01 PROCEDURE — 93000 ELECTROCARDIOGRAM COMPLETE: CPT

## 2022-06-01 NOTE — PHYSICAL EXAM
[Well Developed] : well developed [Well Nourished] : well nourished [Normal Conjunctiva] : normal conjunctiva [Normal Venous Pressure] : normal venous pressure [Normal S1, S2] : normal S1, S2 [No Murmur] : no murmur [Clear Lung Fields] : clear lung fields [Soft] : abdomen soft [Normal Gait] : normal gait [No Edema] : no edema [de-identified] : Less anxious no acute distress looks well [de-identified] : No egophony

## 2022-06-01 NOTE — ASSESSMENT
[FreeTextEntry1] : Loly Lema  is status post recent A. fib ablation, she remains in sinus rhythm but has symptoms.  Her symptoms of shortness of breath have improved and she has no significant palpitations.  Her thyroid function is on the higher side and the dose of thyroid medication is being reduced.  A noninvasive work-up with echo was unremarkable overall she is clinically stable.  She continues to be in normal sinus rhythm and is asymptomatic.  She is only on Toprol-XL 25.  She is preop for plastic surgery\par \par 1.  Paroxysmal atrial fibrillation status post ablation remains in normal sinus rhythm\par 2.  2D echo structurally normal heart no valvular disease\par 3.  History of hypothyroidism on thyroid replacement now thyroid function on the higher side dose being reduced\par 4.  Anxiety improved since the last visit\par 5.  Preop for plastic surgery no cardiac issues\par \par The patient is asymptomatic and has remained in normal sinus rhythm without any recurrence of atrial fibrillation.\par \par

## 2022-06-01 NOTE — HISTORY OF PRESENT ILLNESS
[FreeTextEntry1] : Loly is 61 years old and has a history of prior paroxysmal atrial fibrillation.  Several months ago she developed atrial fibrillation with a rapid ventricular response quite symptomatic and ultimately underwent an A. fib ablation and was returned to sinus rhythm.  Her Synthroid dose was decreased somewhat and she was sent home on Toprol-XL.\par \par Her echocardiogram at that time was within normal limits with no evidence of valvular disease normal LV and RV function and no pulmonary hypertension\par \par She feels quite well and denies palpitations chest pain shortness of breath.  She has good exercise tolerance.\par \par EKG today June 1, 2022 normal sinus rhythm within normal limits.\par \par \par

## 2022-06-01 NOTE — REASON FOR VISIT
[FreeTextEntry1] : Loly Lema 61 years old here for follow-up of her cardiac status and preop evaluation prior to undergoing plastic surgery on her neck.

## 2022-06-01 NOTE — DISCUSSION/SUMMARY
[FreeTextEntry1] : I see no cardiac contraindication to the planned plastic surgery.  I would continue the Toprol-XL 25.  No further cardiac work-up is needed.

## 2022-08-26 ENCOUNTER — APPOINTMENT (OUTPATIENT)
Dept: INTERNAL MEDICINE | Facility: CLINIC | Age: 62
End: 2022-08-26

## 2022-08-26 ENCOUNTER — LABORATORY RESULT (OUTPATIENT)
Age: 62
End: 2022-08-26

## 2022-08-26 VITALS
OXYGEN SATURATION: 99 % | HEIGHT: 61 IN | WEIGHT: 103 LBS | HEART RATE: 87 BPM | BODY MASS INDEX: 19.45 KG/M2 | SYSTOLIC BLOOD PRESSURE: 120 MMHG | DIASTOLIC BLOOD PRESSURE: 70 MMHG | TEMPERATURE: 98.2 F

## 2022-08-26 DIAGNOSIS — M79.10 MYALGIA, UNSPECIFIED SITE: ICD-10-CM

## 2022-08-26 PROCEDURE — 36415 COLL VENOUS BLD VENIPUNCTURE: CPT

## 2022-08-26 PROCEDURE — 99214 OFFICE O/P EST MOD 30 MIN: CPT | Mod: 25

## 2022-08-26 RX ORDER — FLUTICASONE PROPIONATE 50 UG/1
50 SPRAY, METERED NASAL DAILY
Qty: 1 | Refills: 3 | Status: DISCONTINUED | COMMUNITY
Start: 2021-10-27 | End: 2022-08-26

## 2022-08-26 RX ORDER — LIOTHYRONINE SODIUM 50 UG/1
50 TABLET ORAL
Refills: 0 | Status: DISCONTINUED | COMMUNITY
Start: 2021-10-25 | End: 2022-08-26

## 2022-08-26 RX ORDER — RIVAROXABAN 20 MG/1
20 TABLET, FILM COATED ORAL
Refills: 0 | Status: DISCONTINUED | COMMUNITY
Start: 2021-10-25 | End: 2022-08-26

## 2022-08-26 NOTE — PHYSICAL EXAM
[Normal] : normal rate, regular rhythm, normal S1 and S2 and no murmur heard [de-identified] : No muscle tenderness but range of motion is limited

## 2022-08-26 NOTE — ASSESSMENT
[FreeTextEntry1] : Is a 61-year-old female seen intermittently in by this practice.  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  She has had the recent onset of severe muscle pain and shoulder pain limiting her range of motion.  She has no muscle tenderness.  And gives no symptoms of weakness\par \par Her physical examination is essentially negative\par \par She also relates that she has had COVID 3 times with various different manifestations.  She also states that she tested negative after the initial positivity and her last episode\par \par She states that she has had a little bit of shortness throat yesterday which disappeared however we will get a COVID

## 2022-08-26 NOTE — HISTORY OF PRESENT ILLNESS
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  She now presents with arm pain which is both muscular and joint mostly in the shoulders.  She has no difficulty getting out of chairs combing her hair or brushing her teeth etc.  She does have muscle tenderness.\par \par She also states she has had COVID 3 times with different manifestations each time\par \par On reflection she states she had a little bit of a sore throat yesterday which disappeared\par \par Did do serologic testing CRP sed rate and CPK.  Pending results we will send her to rheumatology or to orthopedics

## 2022-08-27 ENCOUNTER — EMERGENCY (EMERGENCY)
Facility: HOSPITAL | Age: 62
LOS: 1 days | Discharge: HOME | End: 2022-08-27
Attending: EMERGENCY MEDICINE | Admitting: EMERGENCY MEDICINE
Payer: COMMERCIAL

## 2022-08-27 VITALS
SYSTOLIC BLOOD PRESSURE: 115 MMHG | RESPIRATION RATE: 20 BRPM | OXYGEN SATURATION: 97 % | HEART RATE: 89 BPM | DIASTOLIC BLOOD PRESSURE: 71 MMHG | TEMPERATURE: 98 F

## 2022-08-27 VITALS
SYSTOLIC BLOOD PRESSURE: 109 MMHG | RESPIRATION RATE: 16 BRPM | OXYGEN SATURATION: 97 % | DIASTOLIC BLOOD PRESSURE: 74 MMHG | WEIGHT: 103.62 LBS | TEMPERATURE: 99 F | HEIGHT: 61 IN | HEART RATE: 93 BPM

## 2022-08-27 DIAGNOSIS — E83.52 HYPERCALCEMIA: ICD-10-CM

## 2022-08-27 LAB
ALBUMIN SERPL ELPH-MCNC: 3.9 G/DL — SIGNIFICANT CHANGE UP (ref 3.3–5)
ALP SERPL-CCNC: 67 U/L — SIGNIFICANT CHANGE UP (ref 30–120)
ALT FLD-CCNC: 46 U/L DA — SIGNIFICANT CHANGE UP (ref 10–60)
ANION GAP SERPL CALC-SCNC: 8 MMOL/L — SIGNIFICANT CHANGE UP (ref 5–17)
AST SERPL-CCNC: 24 U/L — SIGNIFICANT CHANGE UP (ref 10–40)
BASOPHILS # BLD AUTO: 0.03 K/UL — SIGNIFICANT CHANGE UP (ref 0–0.2)
BASOPHILS NFR BLD AUTO: 0.4 % — SIGNIFICANT CHANGE UP (ref 0–2)
BILIRUB SERPL-MCNC: 0.3 MG/DL — SIGNIFICANT CHANGE UP (ref 0.2–1.2)
BUN SERPL-MCNC: 11 MG/DL — SIGNIFICANT CHANGE UP (ref 7–23)
CALCIUM SERPL-MCNC: 9.3 MG/DL — SIGNIFICANT CHANGE UP (ref 8.4–10.5)
CHLORIDE SERPL-SCNC: 101 MMOL/L — SIGNIFICANT CHANGE UP (ref 96–108)
CO2 SERPL-SCNC: 28 MMOL/L — SIGNIFICANT CHANGE UP (ref 22–31)
CREAT SERPL-MCNC: 0.51 MG/DL — SIGNIFICANT CHANGE UP (ref 0.5–1.3)
EGFR: 105 ML/MIN/1.73M2 — SIGNIFICANT CHANGE UP
EOSINOPHIL # BLD AUTO: 0.16 K/UL — SIGNIFICANT CHANGE UP (ref 0–0.5)
EOSINOPHIL NFR BLD AUTO: 2.1 % — SIGNIFICANT CHANGE UP (ref 0–6)
GLUCOSE SERPL-MCNC: 117 MG/DL — HIGH (ref 70–99)
HCT VFR BLD CALC: 43.4 % — SIGNIFICANT CHANGE UP (ref 34.5–45)
HGB BLD-MCNC: 14.6 G/DL — SIGNIFICANT CHANGE UP (ref 11.5–15.5)
IMM GRANULOCYTES NFR BLD AUTO: 0.1 % — SIGNIFICANT CHANGE UP (ref 0–1.5)
LYMPHOCYTES # BLD AUTO: 2.88 K/UL — SIGNIFICANT CHANGE UP (ref 1–3.3)
LYMPHOCYTES # BLD AUTO: 37.9 % — SIGNIFICANT CHANGE UP (ref 13–44)
MAGNESIUM SERPL-MCNC: 1.7 MG/DL — SIGNIFICANT CHANGE UP (ref 1.6–2.6)
MCHC RBC-ENTMCNC: 28.8 PG — SIGNIFICANT CHANGE UP (ref 27–34)
MCHC RBC-ENTMCNC: 33.6 GM/DL — SIGNIFICANT CHANGE UP (ref 32–36)
MCV RBC AUTO: 85.6 FL — SIGNIFICANT CHANGE UP (ref 80–100)
MONOCYTES # BLD AUTO: 0.73 K/UL — SIGNIFICANT CHANGE UP (ref 0–0.9)
MONOCYTES NFR BLD AUTO: 9.6 % — SIGNIFICANT CHANGE UP (ref 2–14)
NEUTROPHILS # BLD AUTO: 3.78 K/UL — SIGNIFICANT CHANGE UP (ref 1.8–7.4)
NEUTROPHILS NFR BLD AUTO: 49.9 % — SIGNIFICANT CHANGE UP (ref 43–77)
NRBC # BLD: 0 /100 WBCS — SIGNIFICANT CHANGE UP (ref 0–0)
PLATELET # BLD AUTO: 202 K/UL — SIGNIFICANT CHANGE UP (ref 150–400)
POTASSIUM SERPL-MCNC: 4.1 MMOL/L — SIGNIFICANT CHANGE UP (ref 3.5–5.3)
POTASSIUM SERPL-SCNC: 4.1 MMOL/L — SIGNIFICANT CHANGE UP (ref 3.5–5.3)
PROT SERPL-MCNC: 7.7 G/DL — SIGNIFICANT CHANGE UP (ref 6–8.3)
RBC # BLD: 5.07 M/UL — SIGNIFICANT CHANGE UP (ref 3.8–5.2)
RBC # FLD: 12.1 % — SIGNIFICANT CHANGE UP (ref 10.3–14.5)
SODIUM SERPL-SCNC: 137 MMOL/L — SIGNIFICANT CHANGE UP (ref 135–145)
WBC # BLD: 7.59 K/UL — SIGNIFICANT CHANGE UP (ref 3.8–10.5)
WBC # FLD AUTO: 7.59 K/UL — SIGNIFICANT CHANGE UP (ref 3.8–10.5)

## 2022-08-27 PROCEDURE — 85025 COMPLETE CBC W/AUTO DIFF WBC: CPT

## 2022-08-27 PROCEDURE — 80053 COMPREHEN METABOLIC PANEL: CPT

## 2022-08-27 PROCEDURE — 83735 ASSAY OF MAGNESIUM: CPT

## 2022-08-27 PROCEDURE — 36415 COLL VENOUS BLD VENIPUNCTURE: CPT

## 2022-08-27 PROCEDURE — 99284 EMERGENCY DEPT VISIT MOD MDM: CPT

## 2022-08-27 PROCEDURE — 93010 ELECTROCARDIOGRAM REPORT: CPT

## 2022-08-27 PROCEDURE — 99283 EMERGENCY DEPT VISIT LOW MDM: CPT

## 2022-08-27 PROCEDURE — 93005 ELECTROCARDIOGRAM TRACING: CPT

## 2022-08-27 NOTE — ED ADULT NURSE NOTE - BOWEL SOUNDS RUQ
Addended by: ADALBERTO MARTEL on: 5/3/2019 10:30 AM     Modules accepted: Orders    
Addended by: RAJINDER CHINCHILLA on: 5/3/2019 10:13 AM     Modules accepted: Orders    
present

## 2022-08-27 NOTE — ED PROVIDER NOTE - IV ALTEPLASE DOOR HIDDEN
Neurology Resident Progress Note      SUBJECTIVE:  This is a 78 y.o.  female admitted 7/29/2020 for Dizziness [R42]  Dizziness [R42]  Vertigo [R42]     77 y/o f with a history of DM2, chonic HA, sleep apnea on CPAP, HTN, Atrial fib on Eliquis, CHF. She has been having headaches which have been getting worse over the past three days with dizziness that seems to be lasting longer each day per each episode. The dizziness begins when she stands and resolves when she lays down. She cannot walk without assistance during her episodes. She complains of heavy legs and SOB while walking. She also has some tinnitus that was intense then resolved within 5 minutes. ROS  Constitutional: no fever, chills, fatigue  HENT: No change in vision or hearing   Respiratory: No cough, SOB, wheezing. Cardiovascular:  No chest pain, palpitations, leg swelling. Gastrointestinal: No nausea, vomiting, diarrhea. Genitourinary: No increased frequency, urgency. Musculoskeletal: No myalgia or arthralgia. Skin: No rashes or scarring or bruises. Neurological: No headache, paresthesia, or focal weakness. Endo/Heme/Allergies: Negative for itchy eyes or runny nose. Psychiatric/Behavioral: No anxiety or depressed mood.      HPI  See H&P     aspirin  81 mg Oral Once    [Held by provider] amitriptyline  50 mg Oral Nightly    [Held by provider] amLODIPine  10 mg Oral Daily    atorvastatin  80 mg Oral Nightly    apixaban  5 mg Oral BID    furosemide  20 mg Oral Daily    metoprolol tartrate  25 mg Oral BID    oxybutynin  10 mg Oral Daily    pantoprazole  40 mg Oral QAM AC    sodium chloride flush  10 mL Intravenous 2 times per day    insulin lispro  0-6 Units Subcutaneous TID WC    insulin lispro  0-3 Units Subcutaneous Nightly       Past Medical History:   Diagnosis Date    [facility]IN [facility]acute kidney [facility]) [facility] 3/3/2017    Arthritis     shoulders    Chronic diastolic congestive heart failure ([facility] [address] 3/3/2017    Chronic kidney disease     Coronary artery disease involving native coronary artery of native heart without angina pectoris 10/13/2016    Epistaxis since Jan. 17 2019    GERD (gastroesophageal reflux disease)     Headache     Hyperlipidemia     Hypertension     [facility]    [facility]. Joseluis Brown Neuromuscular [facility])     Osteoarthritis     Patient in clinical research study 03/06/2017    AbsorbIV    Sleep apnea     CPAP    Snores     Type II or unspecified type diabetes mellitus without mention of complication, not stated as uncontrolled     Use of cane as ambulatory aid     Wears contact lenses     LEFT EYE ONLY       Past Surgical History:   Procedure Laterality Date    CONTROL OF NOSE BLEED LIGATION & NASAL ENDOSCOPY Right 04/08/2019    turbinoplasty    CORONARY ANGIOPLASTY WITH STENT PLACEMENT  2015    2 stents/ Dr. Chet Bishop, [address] [place] [address] ARTHROSCOPY Left     NASAL SINUS SURGERY Right 4/8/2019    NASAL ENDOSCOPIC, NASAL CAUTERY performed by Sangeeta Frazier MD at [address] [place] [address] Right 07/21/2016    TOTAL KNEE ARTHROPLASTY Left 03/2016    TURBINOPLASTIES Right 4/8/2019    TURBINOPLASTY performed by Sangeeta Frazier MD at [address] [place] [address] Drive:      Blood pressure 137/76, pulse 62, temperature 98.3 °F (36.8 °C), temperature source Oral, resp. rate 16, height 5' 9\" (1.753 m), weight 297 lb (134.7 kg), SpO2 98 %, not currently breastfeeding. General Examination    General Resting comfortably in bed   Head Normocephalic, without obvious abnormality   Neck Supple, symmetrical. Good ROM. No midline or paraspinal tenderness. Lungs Respirations unlabored, no wheezing   Chest Wall No deformity   Heart RRR, no murmur   Abdomen Soft. Non-tender, non-distended   Extremities No cyanosis or edema or warmth. Pulses 2+ and symmetric   Skin: Skin  turgor normal, no rashes or lesions     Mental status  Speech Alert.  Oriented to 24 20 - 31 mmol/L    Anion Gap 15 9 - 17 mmol/L    GFR Non-African American >60 >60 mL/min    GFR African American >60 >60 mL/min    GFR Comment          GFR Staging NOT REPORTED    Troponin    Collection Time: 07/29/20  4:57 PM   Result Value Ref Range    Troponin, High Sensitivity 9 0 - 14 ng/L    Troponin T NOT REPORTED <0.03 ng/mL    Troponin Interp NOT REPORTED    Protime-INR    Collection Time: 07/29/20  4:57 PM   Result Value Ref Range    Protime 11.0 9.0 - 12.0 sec    INR 1.0    APTT    Collection Time: 07/29/20  4:57 PM   Result Value Ref Range    PTT 31.9 (H) 20.5 - 30.5 sec   CBC Auto Differential    Collection Time: 07/29/20  4:57 PM   Result Value Ref Range    WBC 8.4 3.5 - 11.3 k/uL    RBC 4.75 3.95 - 5.11 m/uL    Hemoglobin 13.7 11.9 - 15.1 g/dL    Hematocrit 44.0 36.3 - 47.1 %    MCV 92.6 82.6 - 102.9 fL    MCH 28.8 25.2 - 33.5 pg    MCHC 31.1 28.4 - 34.8 g/dL    RDW 14.4 11.8 - 14.4 %    Platelets 232 833 - 827 k/uL    MPV 9.8 8.1 - 13.5 fL    NRBC Automated 0.0 0.0 per 100 WBC    Differential Type NOT REPORTED     Seg Neutrophils 55 36 - 65 %    Lymphocytes 34 24 - 43 %    Monocytes 10 3 - 12 %    Eosinophils % 1 1 - 4 %    Basophils 0 0 - 2 %    Immature Granulocytes 0 0 %    Segs Absolute 4.55 1.50 - 8.10 k/uL    Absolute Lymph # 2.81 1.10 - 3.70 k/uL    Absolute Mono # 0.83 0.10 - 1.20 k/uL    Absolute Eos # 0.11 0.00 - 0.44 k/uL    Basophils Absolute 0.03 0.00 - 0.20 k/uL    Absolute Immature Granulocyte <0.03 0.00 - 0.30 k/uL    WBC Morphology NOT REPORTED     RBC Morphology NOT REPORTED     Platelet Estimate NOT REPORTED    POC Glucose Fingerstick    Collection Time: 07/30/20  2:18 AM   Result Value Ref Range    POC Glucose 154 (H) 65 - 105 mg/dL   Basic Metabolic Panel w/ Reflex to MG    Collection Time: 07/30/20  6:13 AM   Result Value Ref Range    Glucose 161 (H) 70 - 99 mg/dL    BUN 17 8 - 23 mg/dL    CREATININE 0.89 0.50 - 0.90 mg/dL    Bun/Cre Ratio NOT REPORTED 9 - 20    Calcium 9.1 8.6 - 10.4 mg/dL    Sodium 139 135 - 144 mmol/L    Potassium 4.2 3.7 - 5.3 mmol/L    Chloride 103 98 - 107 mmol/L    CO2 23 20 - 31 mmol/L    Anion Gap 13 9 - 17 mmol/L    GFR Non-African American >60 >60 mL/min    GFR African American >60 >60 mL/min    GFR Comment          GFR Staging NOT REPORTED    CBC    Collection Time: 07/30/20  6:13 AM   Result Value Ref Range    WBC 6.6 3.5 - 11.3 k/uL    RBC 4.31 3.95 - 5.11 m/uL    Hemoglobin 12.6 11.9 - 15.1 g/dL    Hematocrit 40.7 36.3 - 47.1 %    MCV 94.4 82.6 - 102.9 fL    MCH 29.2 25.2 - 33.5 pg    MCHC 31.0 28.4 - 34.8 g/dL    RDW 14.6 (H) 11.8 - 14.4 %    Platelets 991 347 - 500 k/uL    MPV 9.9 8.1 - 13.5 fL    NRBC Automated 0.0 0.0 per 100 WBC   Protime-INR    Collection Time: 07/30/20  6:13 AM   Result Value Ref Range    Protime 10.6 9.0 - 12.0 sec    INR 1.0    POC Glucose Fingerstick    Collection Time: 07/30/20  7:36 AM   Result Value Ref Range    POC Glucose 126 (H) 65 - 105 mg/dL       Imaging/Diagnostics:  Xr Chest (2 Vw)    Result Date: 7/29/2020  EXAMINATION: TWO XRAY VIEWS OF THE CHEST 7/29/2020 5:18 pm COMPARISON: Chest x-ray, 02/07/2019 HISTORY: ORDERING SYSTEM PROVIDED HISTORY: vertigo TECHNOLOGIST PROVIDED HISTORY: vertigo FINDINGS: The cardiomediastinal contours are stable with cardiomegaly. The lungs are clear bilaterally. There is no evidence of focal consolidation, pulmonary edema, pleural effusion or pneumothorax. There is no blunting of the costophrenic angles on the lateral view. Stable chest with cardiomegaly. No acute cardiopulmonary process. Ct Head Wo Contrast    Result Date: 7/29/2020  EXAMINATION: CT OF THE HEAD WITHOUT CONTRAST 7/29/2020 7:54 pm TECHNIQUE: CT of the head was performed without the administration of intravenous contrast. Dose modulation, iterative reconstruction, and/or weight based adjustment of the mA/kV was utilized to reduce the radiation dose to as low as reasonably achievable.  COMPARISON: September 8, 2017 HISTORY: ORDERING SYSTEM PROVIDED HISTORY: Vertigo, on elliquis, Hx of brain mass TECHNOLOGIST PROVIDED HISTORY: Vertigo, on elliquis, Hx of brain mass FINDINGS: No acute intracranial hemorrhage. No mass effect. No midline shift. Ventricles and sulci are within normal limits. Partially empty sella. No acute soft tissue abnormality. No acute orbital abnormality. No acute osseous abnormality. No significant paranasal sinus disease. No acute intracranial findings. Cta Head Neck W Contrast    Result Date: 7/29/2020  EXAMINATION: CTA OF THE HEAD AND NECK WITH CONTRAST 7/29/2020 7:54 pm: TECHNIQUE: CTA of the head and neck was performed with the administration of intravenous contrast. Multiplanar reformatted images are provided for review. MIP images are provided for review. Stenosis of the internal carotid arteries measured using NASCET criteria. Dose modulation, iterative reconstruction, and/or weight based adjustment of the mA/kV was utilized to reduce the radiation dose to as low as reasonably achievable. COMPARISON: None. HISTORY: ORDERING SYSTEM PROVIDED HISTORY: vertigo on [facility] TECHNOLOGIST PROVIDED HISTORY: vertigo on [facility] Reason for Exam: vertigo Acuity: Acute Type of Exam: Initial FINDINGS: CTA NECK: AORTIC ARCH/ARCH VESSELS: No dissection or arterial injury. No significant stenosis of the brachiocephalic or subclavian arteries. CAROTID ARTERIES: No dissection, arterial injury, or hemodynamically significant stenosis by NASCET criteria. VERTEBRAL ARTERIES: No dissection, arterial injury, or significant stenosis. SOFT TISSUES: The lung apices are clear. No cervical or superior mediastinal lymphadenopathy. The larynx and pharynx are unremarkable. No acute abnormality of the salivary and thyroid glands. BONES: There is no acute fracture or suspect osseous lesion. There are mild degenerative changes in the cervical spine.  CTA HEAD: ANTERIOR CIRCULATION: No significant stenosis of the intracranial internal carotid, anterior cerebral, or middle cerebral arteries. No aneurysm. POSTERIOR CIRCULATION: No significant stenosis of the vertebral, basilar, or posterior cerebral arteries. No aneurysm. The posterior cerebral arteries have fetal origins. OTHER: No dural venous sinus thrombosis on this non-dedicated study. BRAIN: The patient's known tiny left frontal meningioma is not evident on the current study due to technique. No mass effect or midline shift. No extra-axial fluid collection. The gray-white differentiation is maintained. No acute arterial abnormality or hemodynamically significant arterial stenosis in the head or neck. Assessment & Differential Dx:      Primary Problem  Benign paroxysmal positional vertigo    Active Hospital Problems    Diagnosis Date Noted    Dizziness [R42] 07/29/2020    Vertigo [R42] 07/29/2020       Case of   77 y/o f pmh of HA, sleep apnea, meningioma, , T2DM, HEMALATHA, HTN, diastolic CHF, AF on eliquis, presents with dizziness, HA, difficulty walking, dyspnea    Meds: meclizine, asa 81, tyelnol 650    1. vertigo   2. migraine  3. chf related symptoms - dyspnea   5.  Meningioma     Plan:     MRI brain w w/o w/ internal auditory canal view cuts  Antivert increased to 25mg TID  PT/OT  Internal medicine -  Recommendation for suspected otitis media with rupture and dyspnea on exertion  Outpatient follow up with neurosurgery for meningioma yearly per their recommendations           Serge Lay DPM, 7/30/2020 8:21 AM show

## 2022-08-27 NOTE — ED PROVIDER NOTE - PATIENT PORTAL LINK FT
You can access the FollowMyHealth Patient Portal offered by [facility] by registering at the following website: [email or web address]. By joining [facility]’s FollowMyHealth portal, you will also be able to view your health information using other applications (apps) compatible with our system.

## 2022-08-27 NOTE — ED PROVIDER NOTE - OBJECTIVE STATEMENT
60 y/o female with PMHx Hypothyroidism sent by PMD due to Hyperkalemia. reports she had labs drawn yesterday in which noted elevated potassium. Reports feeling well without complaints. pt denies chest pain, SOB, palpitations, dizziness, fever, vomiting, diarrhea, or any other complaints.

## 2022-08-27 NOTE — ED PROVIDER NOTE - NSFOLLOWUPINSTRUCTIONS_ED_ALL_ED_FT
Follow up with your primary care doctor for repeat testing. Return of chest pain, palpitations, worsening condition.     Hyperkalemia      Hyperkalemia is when you have too much potassium in your blood. Potassium helps your body in many ways, but having too much can cause problems. If there is too much potassium in your blood, it can affect how your heart works.    Potassium is normally removed from your body by your kidneys. Many things can cause the amount in your blood to be high. Medicines and other treatments can be used to bring the amount to a normal level. Treatment may need to be done in the hospital.      Follow these instructions at home:     •Take over-the-counter and prescription medicines only as told by your doctor.    • Do not take any of the following unless your doctor says it is okay:  •Supplements.      •Natural products.      •Herbs.      •Vitamins.        •Limit how much alcohol you drink as told by your doctor.      • Do not use drugs. If you need help quitting, ask your doctor.      •If you have kidney disease, you may need to follow a low-potassium diet. A  (dietitian) can help you.      •Keep all follow-up visits as told by your doctor. This is important.        Contact a doctor if:    •Your heartbeat is not regular or is very slow.      •You feel dizzy (light-headed).      •You feel weak.      •You feel sick to your stomach (nauseous).      •You have tingling in your hands or feet.      •You lose feeling (have numbness) in your hands or feet.        Get help right away if:    •You are short of breath.      •You have chest pain.      •You pass out (faint).      •You cannot move your muscles.        Summary    •Hyperkalemia is when you have too much potassium in your blood.      •Take over-the-counter and prescription medicines only as told by your doctor.      •Limit how much alcohol you drink as told by your doctor.      •Contact a doctor if your heartbeat is not regular.      This information is not intended to replace advice given to you by your health care provider. Make sure you discuss any questions you have with your health care provider.

## 2022-08-27 NOTE — ED PROVIDER NOTE - NS ED ATTENDING STATEMENT MOD
75y male w/ hx of GBM here for radiation
Son Catarino is Health care Proxy. For historical information, patient seen at ethics service at [facility] by this provider in December 2018. See attached note from December 24 2018  Attending: Resident: Petra Frye  Other Attending: Dr. RICHARD Roblero, Psychiatry  Consultant: CATHY Hussein MD & MAURILIO Beyer [facility] – Medical Ethics Contact #s: [phone](c)    Consult purpose:  To assist in the ethical dilemma caused by family conflict regarding surrogate designation for a 74 year old male patient deemed without capacity for disposition determination.    Clinical Progress Summary:  Mr. 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  On Dec 21 the patient was evaluated by psychiatry and felt to have impaired cognition and poor insight and judgment. Psychiatrist Dr. Roblero wrote that the patient lacks the capacity to make his decision about disposition for discharge. The patient has two sons whom case management and social work has contacted to assist in discharge planning. His son Demetrio lives locally with his wife and is requesting nursing aides. Demetrio as per case management notes lacks the financial resources to provide care. Catarino, whom lives in [place] desires to provide for his father. Ethics consult called to aid in the discord between the patient’s son and determine appropriate surrogate. Specifically, the team seeks clarification on the roles of the children as surrogate decision makers because of their disagreement in who should care for their father.     PMH: HTN, DM, BPH, CVA with left sided weakness, possible glioblastoma  Medication: Keppra 1000mg BID, Decadron 2mg BID, Finasteride 5mg QD, Simvastatin 20mg QD  Allergies:   Prognosis Estimate:  years  Patient Decision-Making Capacity:   Lacks capacity for complex decision making including discharge planning due to cognitive impairment  Name of medical decision-maker: Determined by this consult to be Son: Catarino (son) [phone](Lives in [place])  Other Stakeholder(s): Son: Demetrio ([phone]), Daughter in law Norma; Daughter: Info lacking at this time (Mr. Duncan says he has not seen her in a long time)  Patient Aware of:  Diagnosis:   Yes  Prognosis:    Yes  Surrogate Aware of:  Diagnosis:  Yes   Prognosis:   Yes  Resuscitation Status:  DNR:  No   DNI:   No  Evidence of Patient’s Preference of Life-Sustaining Treatment (Written or Oral):  none  Discussion-  12/24 13:20 14:05  EthicistCATHY spoke with the [facility] resident William Frye at the bedside of the patient. Dr. Frye provided an update on the conversations he had with the patient’s two sons. CATHY Hussein then spoke with Guru Duncan about his health and future plans. Mr. Duncan said he wanted to go to [place] and stay with his son Catarino because he would able to care for him and take him to his appointments.  Mr. Duncan was aware that he had a brain tumor and needed additional medical treatments that he could get in [place]. He also discussed the hard feeling that Demetrio expressed concerning his brother Catarino.  Discussed with team that patient had capacity for the specific decision related to determination of surrogate. Team placed a psychiatry consult to assess.  Ethics analysis: The Bioethical "operational principle" of Autonomy (the patient’s own right to self-determination in matters related to the integrity of his body and self) must be balanced with the principles of Beneficence (which leads his physicians to recommend (diagnostic procedures) and Non-Maleficence (the physician’s duty to "do no harm" by an intervention that has distinct risks must be taken into consideration when exploring this case. All patients, regardless of age, sex, nationality, race/ethnicity, or social class have autonomy, or the inherent right to decide what is done to him or her.   Essential to autonomy is determining if the patient has decisional capacity.  Assessing a patient's medical decision-making capacity is part of every medical encounter and by using a directed clinical interview, primary care physicians are able to perform these evaluations in most cases.1 Capacity can be evaluated utilizing the pneumonic (C-U-A-R). Communication, Understanding, Appreciation and Reasoning. Mr. Duncan was able to articulate and communicate well and is aware of his diagnosis and potential risks of not getting follow up and the reasons why he chooses to go to his son’s residence in [place]. In this case, Guru Duncan was deemed not to have decision-making capacity by Psychiatry on Dec 21 2018 secondary to his cognitive impairment and poor insight into his medical condition. Yet, capacity needs to be assessed specific to the medical decision in question. Although an individual may lack capacity for complex decisions they may retain capacity for low threshold decisions. Guru Duncan has MORAL STATUS, his distinct personhood, culture, values, beliefs, and background, which must be respected.  Mr. Duncan has personhood (moral status) and in regards to the character assessment of his sons; he is able to provide assent and consent regarding his disposition to their care. Thus, this consult concludes that Mr. Duncan has capacity specific to the decision of naming a surrogate. In this case it has been determined that Mr. Guru Duncan does have the capacity to choose his surrogate decision maker. For Mr. Duncan’s other medical treatment decisions that require informed consent they would fall on a surrogate decision-maker.  Ideally, the patient would have a healthcare proxy (HCP) form. Ethics suggest the completion of a health care proxy form with Mr. Duncan this admission. In the absence of the patient’s decisional capacity, the patient’s health care proxy has the moral agency to respect this autonomy and interpret what Guru Duncan would have wanted by utilizing substituted judgement.       		  Conclusion:   Ethics commends the [facility] Team for their virtue in providing care and support for Guru Duncan and his family during this challenging time.  Mr. Guru Duncan does have the capacity to choose who he can be discharged with and his surrogate decision maker.  The surrogate for Guru Duncan, based on interviews is his son Catarino who lives in [place]. Upon his arrival from [place], ethics suggests the completion of both a health care proxy form as well as advanced directives [place] MOLST form.    Open and honest communication about prognosis is of utmost importance when supporting a patient’s family in this situation.  Ethics will be available to the patient, family and team throughout the patient’s stay in the hospital and will continue to assuage the surrogate’s caregiver burden, grief and psycho-spiritual suffering.     Thank you again for allowing us to participate on this challenging case. Please don’t hesitate to call us if there are any additional questions or concerns.
This was a shared visit with the TAMI. I reviewed and verified the documentation and independently performed the documented:

## 2022-08-27 NOTE — ED PROVIDER NOTE - NSFOLLOWUPCLINICS_GEN_ALL_ED_FT
[facility] - Primary Care  Primary Care  [address] [place] [place] [address]  Phone: [phone]  Fax:   Follow Up Time: 1-3 Days

## 2022-08-27 NOTE — ED ADULT NURSE NOTE - OBJECTIVE STATEMENT
60yo female walked into ED, pt advised staff she was sent to ED by pcp to have her potasium serum was elevated. pt denies pain/distress, SOB or fever.

## 2022-08-27 NOTE — ED PROVIDER NOTE - CLINICAL SUMMARY MEDICAL DECISION MAKING FREE TEXT BOX
pt referred by pmd for hyperkalemia. pt reports she had labs done at outpatient visit yesterday. denies cp, palpitations, sob, dizziness, nausea, vomiting.    plan - ekg and labs

## 2022-08-29 LAB
ALBUMIN SERPL ELPH-MCNC: 4.4 G/DL
ALP BLD-CCNC: 68 U/L
ALT SERPL-CCNC: 34 U/L
ANION GAP SERPL CALC-SCNC: 15 MMOL/L
AST SERPL-CCNC: 24 U/L
BASOPHILS # BLD AUTO: 0.03 K/UL
BASOPHILS NFR BLD AUTO: 0.4 %
BILIRUB SERPL-MCNC: 0.6 MG/DL
BUN SERPL-MCNC: 13 MG/DL
CALCIUM SERPL-MCNC: 10.7 MG/DL
CHLORIDE SERPL-SCNC: 100 MMOL/L
CHOLEST SERPL-MCNC: 196 MG/DL
CK SERPL-CCNC: 34 U/L
CO2 SERPL-SCNC: 23 MMOL/L
CREAT SERPL-MCNC: 0.48 MG/DL
CRP SERPL-MCNC: <3 MG/L
EGFR: 107 ML/MIN/1.73M2
EOSINOPHIL # BLD AUTO: 0.12 K/UL
EOSINOPHIL NFR BLD AUTO: 1.6 %
ERYTHROCYTE [SEDIMENTATION RATE] IN BLOOD BY WESTERGREN METHOD: 22 MM/HR
ESTIMATED AVERAGE GLUCOSE: 111 MG/DL
GLUCOSE SERPL-MCNC: 100 MG/DL
HBA1C MFR BLD HPLC: 5.5 %
HCT VFR BLD CALC: 46.3 %
HDLC SERPL-MCNC: 61 MG/DL
HGB BLD-MCNC: 15.2 G/DL
IMM GRANULOCYTES NFR BLD AUTO: 0.4 %
LDLC SERPL CALC-MCNC: 102 MG/DL
LYMPHOCYTES # BLD AUTO: 2.41 K/UL
LYMPHOCYTES NFR BLD AUTO: 31.6 %
MAN DIFF?: NORMAL
MCHC RBC-ENTMCNC: 28.4 PG
MCHC RBC-ENTMCNC: 32.8 GM/DL
MCV RBC AUTO: 86.5 FL
MONOCYTES # BLD AUTO: 0.72 K/UL
MONOCYTES NFR BLD AUTO: 9.4 %
NEUTROPHILS # BLD AUTO: 4.32 K/UL
NEUTROPHILS NFR BLD AUTO: 56.6 %
NONHDLC SERPL-MCNC: 135 MG/DL
PLATELET # BLD AUTO: 207 K/UL
POTASSIUM SERPL-SCNC: 5.6 MMOL/L
PROT SERPL-MCNC: 7.4 G/DL
RBC # BLD: 5.35 M/UL
RBC # FLD: 12.2 %
SARS-COV-2 N GENE NPH QL NAA+PROBE: NOT DETECTED
SODIUM SERPL-SCNC: 137 MMOL/L
T3RU NFR SERPL: 0.9 TBI
T4 SERPL-MCNC: 8.1 UG/DL
TRIGL SERPL-MCNC: 165 MG/DL
TSH SERPL-ACNC: <0.01 UIU/ML
URATE SERPL-MCNC: 5.6 MG/DL
WBC # FLD AUTO: 7.63 K/UL

## 2022-08-30 LAB
CALCIUM SERPL-MCNC: 10.8 MG/DL
PARATHYROID HORMONE INTACT: 25 PG/ML

## 2022-08-31 LAB
THYROGLOB AB SERPL-ACNC: <20 IU/ML
THYROPEROXIDASE AB SERPL IA-ACNC: <10 IU/ML

## 2022-10-05 ENCOUNTER — APPOINTMENT (OUTPATIENT)
Dept: ORTHOPEDIC SURGERY | Facility: CLINIC | Age: 62
End: 2022-10-05

## 2022-11-14 ENCOUNTER — RX RENEWAL (OUTPATIENT)
Age: 62
End: 2022-11-14

## 2022-11-28 ENCOUNTER — RESULT REVIEW (OUTPATIENT)
Age: 62
End: 2022-11-28

## 2022-11-28 ENCOUNTER — APPOINTMENT (OUTPATIENT)
Dept: ULTRASOUND IMAGING | Facility: HOSPITAL | Age: 62
End: 2022-11-28

## 2022-11-28 ENCOUNTER — APPOINTMENT (OUTPATIENT)
Dept: MAMMOGRAPHY | Facility: HOSPITAL | Age: 62
End: 2022-11-28

## 2022-11-28 ENCOUNTER — OUTPATIENT (OUTPATIENT)
Dept: OUTPATIENT SERVICES | Facility: HOSPITAL | Age: 62
LOS: 1 days | End: 2022-11-28
Payer: COMMERCIAL

## 2022-11-28 DIAGNOSIS — Z00.8 ENCOUNTER FOR OTHER GENERAL EXAMINATION: ICD-10-CM

## 2022-11-28 PROCEDURE — 77063 BREAST TOMOSYNTHESIS BI: CPT

## 2022-11-28 PROCEDURE — 76641 ULTRASOUND BREAST COMPLETE: CPT

## 2022-11-28 PROCEDURE — 77067 SCR MAMMO BI INCL CAD: CPT | Mod: 26

## 2022-11-28 PROCEDURE — 77063 BREAST TOMOSYNTHESIS BI: CPT | Mod: 26

## 2022-11-28 PROCEDURE — 76641 ULTRASOUND BREAST COMPLETE: CPT | Mod: 26,50

## 2022-11-28 PROCEDURE — 77067 SCR MAMMO BI INCL CAD: CPT

## 2023-01-16 ENCOUNTER — RX RENEWAL (OUTPATIENT)
Age: 63
End: 2023-01-16

## 2023-01-31 NOTE — DISCHARGE NOTE NURSING/CASE MANAGEMENT/SOCIAL WORK - NSDCPEXARELTOREACT_GEN_ALL_CORE
Rivaroxaban/Xarelto increases your risk for bleeding. Notify your doctor if you experience any of the following side effects: unusual bleeding or bruising, vomiting blood or coffee ground-like material, red or black stool, itching or hives, chest tightness, trouble breathing, swelling in your face or hands, swelling in your mouth or throat, change in how much or how often you urinate, red or brown urine, heavy menstrual or vaginal bleeding, or blistering or peeling skin. When Rivaroxaban/Xarelto is taken with other medicines, they can affect how it works. Taking other medications such as aspirin, antibiotics, antifungals, blood thinners, nonsteroidal anti-inflammatories, and medications that treat depression can increase your risk of bleeding. It is very important to tell your health care provider about all of the other medicines, including over-the-counter medications, herbs, and vitamins you are taking.  DO NOT start, stop, or change the dosage of any medicine, including over-the-counter medicines, vitamins, and herbal products without your doctor’s approval.  Any products containing aspirin or are nonsteroidal anti-inflammatories lessen the blood’s ability to form clots and adds to the effect of Rivaroxaban/Xarelto. Never take aspirin or medicines that contain aspirin without speaking to your doctor.
full range of motion in all extremities

## 2023-02-10 ENCOUNTER — RX RENEWAL (OUTPATIENT)
Age: 63
End: 2023-02-10

## 2023-03-03 ENCOUNTER — RX RENEWAL (OUTPATIENT)
Age: 63
End: 2023-03-03

## 2023-03-09 RX ORDER — ZOLEDRONIC ACID 5 MG/100ML
5 INJECTION INTRAVENOUS
Qty: 0 | Refills: 0 | Status: COMPLETED | OUTPATIENT
Start: 2023-03-09 | End: 1900-01-01

## 2023-03-31 ENCOUNTER — APPOINTMENT (OUTPATIENT)
Dept: RHEUMATOLOGY | Facility: CLINIC | Age: 63
End: 2023-03-31
Payer: COMMERCIAL

## 2023-03-31 VITALS
SYSTOLIC BLOOD PRESSURE: 114 MMHG | OXYGEN SATURATION: 98 % | HEART RATE: 84 BPM | DIASTOLIC BLOOD PRESSURE: 74 MMHG | BODY MASS INDEX: 19.07 KG/M2 | TEMPERATURE: 97.9 F | WEIGHT: 101 LBS | HEIGHT: 61 IN

## 2023-03-31 VITALS — SYSTOLIC BLOOD PRESSURE: 107 MMHG | HEART RATE: 80 BPM | DIASTOLIC BLOOD PRESSURE: 73 MMHG | OXYGEN SATURATION: 98 %

## 2023-03-31 PROCEDURE — 96374 THER/PROPH/DIAG INJ IV PUSH: CPT | Mod: 59

## 2023-03-31 RX ORDER — ZOLEDRONIC ACID 5 MG/100ML
5 INJECTION INTRAVENOUS
Qty: 0 | Refills: 0 | Status: COMPLETED
Start: 2023-03-09

## 2023-03-31 NOTE — HISTORY OF PRESENT ILLNESS
[Denies] : Denies [No] : No [Yes] : Yes [24g] : 24g [Start Time: ___] : Medication Start Time: [unfilled] [End Time: ___] : Medication End Time: [unfilled] [IV discontinued. Intact. No signs or symptoms of IV complications noted. Time: ___] : IV discontinued. Intact. No signs or symptoms of IV complications noted. Time: [unfilled] [Patient  instructed to seek medical attention with signs and symptoms of adverse effects] : Patient  instructed to seek medical attention with signs and symptoms of adverse effects [Patient left unit in no acute distress] : Patient left unit in no acute distress [Medications administered as ordered and tolerated well.] : Medications administered as ordered and tolerated well. [de-identified] : left median cubital vein [de-identified] : Patient presents for Reclast (Zoledronic) infusion in NAD. Patient denies any recent fever, infection, or surgery. Medication education provided; patient verbalized understanding and consented to today's infusion. No other symptoms or concerns were verbalized. In conclusion, patient left the unit in [facility].

## 2023-04-21 ENCOUNTER — RX RENEWAL (OUTPATIENT)
Age: 63
End: 2023-04-21

## 2023-06-02 ENCOUNTER — RX RENEWAL (OUTPATIENT)
Age: 63
End: 2023-06-02

## 2023-06-03 ENCOUNTER — OUTPATIENT (OUTPATIENT)
Dept: OUTPATIENT SERVICES | Facility: HOSPITAL | Age: 63
LOS: 1 days | End: 2023-06-03
Payer: COMMERCIAL

## 2023-06-03 ENCOUNTER — APPOINTMENT (OUTPATIENT)
Dept: CT IMAGING | Facility: IMAGING CENTER | Age: 63
End: 2023-06-03
Payer: COMMERCIAL

## 2023-06-03 DIAGNOSIS — Z00.8 ENCOUNTER FOR OTHER GENERAL EXAMINATION: ICD-10-CM

## 2023-06-03 PROCEDURE — 74177 CT ABD & PELVIS W/CONTRAST: CPT | Mod: 26

## 2023-06-03 PROCEDURE — 74177 CT ABD & PELVIS W/CONTRAST: CPT

## 2023-08-03 ENCOUNTER — RX RENEWAL (OUTPATIENT)
Age: 63
End: 2023-08-03

## 2023-08-21 ENCOUNTER — APPOINTMENT (OUTPATIENT)
Dept: ULTRASOUND IMAGING | Facility: CLINIC | Age: 63
End: 2023-08-21
Payer: COMMERCIAL

## 2023-08-21 ENCOUNTER — APPOINTMENT (OUTPATIENT)
Dept: INTERNAL MEDICINE | Facility: CLINIC | Age: 63
End: 2023-08-21
Payer: COMMERCIAL

## 2023-08-21 VITALS — WEIGHT: 104 LBS | HEIGHT: 61 IN | BODY MASS INDEX: 19.63 KG/M2

## 2023-08-21 DIAGNOSIS — M79.669 PAIN IN UNSPECIFIED LOWER LEG: ICD-10-CM

## 2023-08-21 PROCEDURE — 99202 OFFICE O/P NEW SF 15 MIN: CPT

## 2023-08-21 PROCEDURE — 99212 OFFICE O/P EST SF 10 MIN: CPT

## 2023-08-21 PROCEDURE — 93970 EXTREMITY STUDY: CPT

## 2023-08-21 NOTE — PHYSICAL EXAM
[Normal] : normal rate, regular rhythm, normal S1 and S2 and no murmur heard [de-identified] : Small possible cyst or superficial thrombosis of superficial vein l

## 2023-08-21 NOTE — HISTORY OF PRESENT ILLNESS
[FreeTextEntry8] : This is a 62-year-old female who called this morning because her physical therapist felt something in her calf and thought that she should be seen on an emergent basis  Patient also states that she has been having a throbbing sensation in her calf for 3 days.  She does exercise vigorously and she may have had a mild fall.  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

## 2023-09-24 ENCOUNTER — RX RENEWAL (OUTPATIENT)
Age: 63
End: 2023-09-24

## 2023-10-20 ENCOUNTER — RX RENEWAL (OUTPATIENT)
Age: 63
End: 2023-10-20

## 2023-11-19 ENCOUNTER — RX RENEWAL (OUTPATIENT)
Age: 63
End: 2023-11-19

## 2023-12-22 ENCOUNTER — RX RENEWAL (OUTPATIENT)
Age: 63
End: 2023-12-22

## 2023-12-26 ENCOUNTER — APPOINTMENT (OUTPATIENT)
Dept: INTERNAL MEDICINE | Facility: CLINIC | Age: 63
End: 2023-12-26

## 2024-02-17 ENCOUNTER — APPOINTMENT (OUTPATIENT)
Dept: MAMMOGRAPHY | Facility: CLINIC | Age: 64
End: 2024-02-17
Payer: COMMERCIAL

## 2024-02-17 ENCOUNTER — RESULT REVIEW (OUTPATIENT)
Age: 64
End: 2024-02-17

## 2024-02-17 ENCOUNTER — APPOINTMENT (OUTPATIENT)
Dept: ULTRASOUND IMAGING | Facility: CLINIC | Age: 64
End: 2024-02-17
Payer: COMMERCIAL

## 2024-02-17 ENCOUNTER — OUTPATIENT (OUTPATIENT)
Dept: OUTPATIENT SERVICES | Facility: HOSPITAL | Age: 64
LOS: 1 days | End: 2024-02-17
Payer: COMMERCIAL

## 2024-02-17 DIAGNOSIS — Z00.8 ENCOUNTER FOR OTHER GENERAL EXAMINATION: ICD-10-CM

## 2024-02-17 PROCEDURE — 77067 SCR MAMMO BI INCL CAD: CPT | Mod: 26

## 2024-02-17 PROCEDURE — 76641 ULTRASOUND BREAST COMPLETE: CPT | Mod: 26,50

## 2024-02-17 PROCEDURE — 77063 BREAST TOMOSYNTHESIS BI: CPT

## 2024-02-17 PROCEDURE — 76641 ULTRASOUND BREAST COMPLETE: CPT

## 2024-02-17 PROCEDURE — 77063 BREAST TOMOSYNTHESIS BI: CPT | Mod: 26

## 2024-02-17 PROCEDURE — 77067 SCR MAMMO BI INCL CAD: CPT

## 2024-03-22 DIAGNOSIS — M81.0 AGE-RELATED OSTEOPOROSIS W/OUT CURRENT PATHOLOGICAL FRACTURE: ICD-10-CM

## 2024-03-29 ENCOUNTER — APPOINTMENT (OUTPATIENT)
Dept: RHEUMATOLOGY | Facility: CLINIC | Age: 64
End: 2024-03-29
Payer: COMMERCIAL

## 2024-03-29 VITALS — HEART RATE: 73 BPM | DIASTOLIC BLOOD PRESSURE: 65 MMHG | OXYGEN SATURATION: 98 % | SYSTOLIC BLOOD PRESSURE: 99 MMHG

## 2024-03-29 VITALS
RESPIRATION RATE: 16 BRPM | DIASTOLIC BLOOD PRESSURE: 69 MMHG | OXYGEN SATURATION: 95 % | SYSTOLIC BLOOD PRESSURE: 111 MMHG | TEMPERATURE: 97.9 F | HEART RATE: 77 BPM

## 2024-03-29 PROCEDURE — 96374 THER/PROPH/DIAG INJ IV PUSH: CPT

## 2024-03-29 RX ORDER — ZOLEDRONIC ACID 5 MG/100ML
5 INJECTION INTRAVENOUS
Qty: 0 | Refills: 0 | Status: COMPLETED | OUTPATIENT
Start: 2024-03-26

## 2024-03-29 NOTE — HISTORY OF PRESENT ILLNESS
[N/A] : N/A [Denies] : Denies [No] : No [Left upper extremity] : Left upper extremity [Yes] : Yes [24g] : 24g [Start Time: ___] : Medication Start Time: [unfilled] [End Time: ___] : Medication End Time: [unfilled] [Medication Name: ___] : Medication Name: [unfilled] [IV discontinued. Intact. No signs or symptoms of IV complications noted. Time: ___] : IV discontinued. Intact. No signs or symptoms of IV complications noted. Time: [unfilled] [Total Amount Administered: ___] : Total Amount Administered: [unfilled] [Patient  instructed to seek medical attention with signs and symptoms of adverse effects] : Patient  instructed to seek medical attention with signs and symptoms of adverse effects [Patient left unit in no acute distress] : Patient left unit in no acute distress [Medications administered as ordered and tolerated well.] : Medications administered as ordered and tolerated well. [de-identified] : Patient presents for scheduled Zoledronic acid infusion. Patient denies any recent infections, abx use, surgery, or hospitalizations. Patient denies any other concerns or symptoms. Patient tolerated infusion well. Patient ambulated off unit, NAD.  [de-identified] : LAC

## 2024-04-28 ENCOUNTER — RX RENEWAL (OUTPATIENT)
Age: 64
End: 2024-04-28

## 2024-04-28 RX ORDER — METOPROLOL SUCCINATE 25 MG/1
25 TABLET, EXTENDED RELEASE ORAL
Qty: 30 | Refills: 3 | Status: ACTIVE | COMMUNITY
Start: 2021-10-25 | End: 1900-01-01

## 2024-05-24 ENCOUNTER — APPOINTMENT (OUTPATIENT)
Dept: INTERNAL MEDICINE | Facility: CLINIC | Age: 64
End: 2024-05-24

## 2024-07-19 ENCOUNTER — APPOINTMENT (OUTPATIENT)
Dept: CARDIOLOGY | Facility: CLINIC | Age: 64
End: 2024-07-19

## 2024-08-29 ENCOUNTER — RX RENEWAL (OUTPATIENT)
Age: 64
End: 2024-08-29

## 2024-08-30 ENCOUNTER — NON-APPOINTMENT (OUTPATIENT)
Age: 64
End: 2024-08-30

## 2024-08-30 ENCOUNTER — APPOINTMENT (OUTPATIENT)
Dept: CARDIOLOGY | Facility: CLINIC | Age: 64
End: 2024-08-30
Payer: COMMERCIAL

## 2024-08-30 VITALS
BODY MASS INDEX: 18.94 KG/M2 | OXYGEN SATURATION: 92 % | HEART RATE: 84 BPM | WEIGHT: 100.25 LBS | DIASTOLIC BLOOD PRESSURE: 74 MMHG | SYSTOLIC BLOOD PRESSURE: 100 MMHG

## 2024-08-30 DIAGNOSIS — Z98.890 OTHER SPECIFIED POSTPROCEDURAL STATES: ICD-10-CM

## 2024-08-30 DIAGNOSIS — R79.89 OTHER SPECIFIED ABNORMAL FINDINGS OF BLOOD CHEMISTRY: ICD-10-CM

## 2024-08-30 DIAGNOSIS — Z86.79 OTHER SPECIFIED POSTPROCEDURAL STATES: ICD-10-CM

## 2024-08-30 DIAGNOSIS — E03.9 HYPOTHYROIDISM, UNSPECIFIED: ICD-10-CM

## 2024-08-30 DIAGNOSIS — F41.9 ANXIETY DISORDER, UNSPECIFIED: ICD-10-CM

## 2024-08-30 PROCEDURE — 93000 ELECTROCARDIOGRAM COMPLETE: CPT

## 2024-08-30 PROCEDURE — 99214 OFFICE O/P EST MOD 30 MIN: CPT

## 2024-08-30 PROCEDURE — G2211 COMPLEX E/M VISIT ADD ON: CPT

## 2024-08-30 RX ORDER — SPIRONOLACTONE 25 MG/1
25 TABLET, FILM COATED ORAL DAILY
Refills: 0 | Status: ACTIVE | COMMUNITY
Start: 2024-08-30

## 2024-08-30 NOTE — HISTORY OF PRESENT ILLNESS
[FreeTextEntry1] : Loly is 63 years old and has a history of prior paroxysmal atrial fibrillation.  In 2022 she developed atrial fibrillation with a rapid ventricular response quite symptomatic and ultimately underwent an A. fib ablation and was returned to sinus rhythm.  Her Synthroid dose was decreased somewhat and she was sent home on Toprol-XL.  Her echocardiogram at that time was within normal limits with no evidence of valvular disease normal LV and RV function and no pulmonary hypertension  She feels quite well and denies palpitations chest pain shortness of breath.  She has good exercise tolerance.  EKG  June 1, 2022 normal sinus rhythm within normal limits.  Visit August 30, 2024 Patient feels extremely well and is here in August 2024 for routine follow-up.  She has no chest pain chest pressure shortness of breath.  She has excellent exercise tolerance.  She exercises regularly without chest pain chest pressure shortness of breath or palpitations.  She does have hypothyroidism and is on 2 medications for hypothyroidism followed carefully by endocrinology.  Apparently the endocrinologist has drawn blood and was told everything was okay.  Blood's in 2022 did have a slightly high potassium and she is on Aldactone for alopecia.  She has no side effects from metoprolol ER and has had no recurrence of atrial fibrillation  EKG August 30, 2024 normal sinus rhythm within normal limits QT corrected 450

## 2024-08-30 NOTE — REASON FOR VISIT
[FreeTextEntry1] : Loly augustin 63 years old here for follow-up of her cardiac status.  She was seen on August 30, 2024

## 2024-08-30 NOTE — DISCUSSION/SUMMARY
[FreeTextEntry1] : 1.  I suggested that she have complete blood work with her internist Dr. Brown and to recheck her potassium 2.  Continue present regimen of medication 3.  No further cardiac workup is needed 4.  Follow-up again in 1 year unless is a change in his status [EKG obtained to assist in diagnosis and management of assessed problem(s)] : EKG obtained to assist in diagnosis and management of assessed problem(s)

## 2024-08-30 NOTE — PHYSICAL EXAM
[Well Developed] : well developed [Well Nourished] : well nourished [Normal Conjunctiva] : normal conjunctiva [Normal Venous Pressure] : normal venous pressure [Normal S1, S2] : normal S1, S2 [No Murmur] : no murmur [Clear Lung Fields] : clear lung fields [Soft] : abdomen soft [Normal Gait] : normal gait [No Edema] : no edema [de-identified] : Less anxious no acute distress looks well [de-identified] : No egophony

## 2024-08-30 NOTE — ASSESSMENT
[FreeTextEntry1] : Loly Lema  is status post recent A. fib ablation, she remains in sinus rhythm but has symptoms.  Her symptoms of shortness of breath have improved and she has no significant palpitations.  Her thyroid function is on the higher side and the dose of thyroid medication is being reduced.  A noninvasive work-up with echo was unremarkable overall she is clinically stable.  She continues to be in normal sinus rhythm and is asymptomatic.  She is only on Toprol-XL 25.   She remains asymptomatic in August 2024 remains in normal sinus rhythm without recurrence of A-fib.  In 2022 she did have a slightly elevated potassium and is on Aldactone 1.  Paroxysmal atrial fibrillation status post ablation remains in normal sinus rhythm-no recurrence of A-fib 2.  2D echo structurally normal heart no valvular disease 2022 3.  History of hypothyroidism on thyroid replacement 4.  Anxiety improved since the last visit 5.  Slightly elevated potassium on previous bloods and 22 patient is on spironolactone and needs repeat blood work  The patient is asymptomatic and has remained in normal sinus rhythm without any recurrence of atrial fibrillation.  She has excellent exercise tolerance

## 2024-09-19 ENCOUNTER — APPOINTMENT (OUTPATIENT)
Dept: OTOLARYNGOLOGY | Facility: CLINIC | Age: 64
End: 2024-09-19

## 2024-09-19 VITALS
BODY MASS INDEX: 19.45 KG/M2 | SYSTOLIC BLOOD PRESSURE: 116 MMHG | HEART RATE: 84 BPM | OXYGEN SATURATION: 98 % | RESPIRATION RATE: 16 BRPM | DIASTOLIC BLOOD PRESSURE: 62 MMHG | TEMPERATURE: 97.4 F | WEIGHT: 103 LBS | HEIGHT: 61 IN

## 2024-09-19 DIAGNOSIS — R09.82 POSTNASAL DRIP: ICD-10-CM

## 2024-09-19 DIAGNOSIS — R07.0 PAIN IN THROAT: ICD-10-CM

## 2024-09-19 PROCEDURE — 99203 OFFICE O/P NEW LOW 30 MIN: CPT | Mod: 25

## 2024-09-19 PROCEDURE — 31575 DIAGNOSTIC LARYNGOSCOPY: CPT

## 2024-09-19 NOTE — CONSULT LETTER
[Dear  ___] : Dear  [unfilled], [Courtesy Letter:] : I had the pleasure of seeing your patient, [unfilled], in my office today. [Please see my note below.] : Please see my note below. [Sincerely,] : Sincerely, [FreeTextEntry2] : Scot Brown MD ([facility]) [FreeTextEntry3] : Gary Nam, DOT, PADakotahC Sr. Physician Assistant, Otolaryngology at [facility] Adjunct Clinical Instructor, Department of Physician Assistant Studies, [address] [address]

## 2024-09-19 NOTE — HISTORY OF PRESENT ILLNESS
[de-identified] : Ms. MARR is a 63 year-old female with history of Behcet's syndrome presents with left throat discomfort.  She reports the discomfort comes and goes and feels like a sore or wound in the left side of her throat.  She states she does not feel the pain and denies exacerbation of pain with swallowing food or liquids.   She denies dysphagia, shortness of breath, GERD, +PND, or radiation of pain.   [Difficulty Swallowing] : no difficulty swallowing [Painful Swallowing] : no painful swallowing

## 2024-09-19 NOTE — PHYSICAL EXAM
[de-identified] : +small septal perforation [Midline] : trachea located in midline position [Laryngoscopy Performed] : laryngoscopy was performed, see procedure section for findings [Normal] : no rashes

## 2024-09-19 NOTE — REVIEW OF SYSTEMS
[Post Nasal Drip] : post nasal drip [As Noted in HPI] : as noted in HPI [Throat Pain] : throat pain [Swelling Neck] : no neck swelling [Swelling Face] : no face swelling [Negative] : Heme/Lymph

## 2024-09-19 NOTE — PROCEDURE
[Topical Lidocaine] : topical lidocaine [Oxymetazoline HCl] : oxymetazoline HCl [Flexible Endoscope] : examined with the flexible endoscope [Serial Number: ___] : Serial Number: [unfilled] [Normal] : posterior cricoid area had healthy pink mucosa in the interarytenoid area and the esophageal inlet [de-identified] : throat pain [FreeTextEntry3] : +pooling of nasal secretions in the posterior nasopharynx.

## 2024-10-08 ENCOUNTER — APPOINTMENT (OUTPATIENT)
Dept: GASTROENTEROLOGY | Facility: CLINIC | Age: 64
End: 2024-10-08
Payer: MEDICARE

## 2024-10-08 VITALS
BODY MASS INDEX: 19.45 KG/M2 | HEART RATE: 87 BPM | TEMPERATURE: 97.4 F | OXYGEN SATURATION: 98 % | HEIGHT: 61 IN | SYSTOLIC BLOOD PRESSURE: 107 MMHG | WEIGHT: 103 LBS | DIASTOLIC BLOOD PRESSURE: 71 MMHG

## 2024-10-08 DIAGNOSIS — Z86.79 OTHER SPECIFIED POSTPROCEDURAL STATES: ICD-10-CM

## 2024-10-08 DIAGNOSIS — M35.2 BEHCET'S DISEASE: ICD-10-CM

## 2024-10-08 DIAGNOSIS — R09.82 POSTNASAL DRIP: ICD-10-CM

## 2024-10-08 DIAGNOSIS — Z98.890 OTHER SPECIFIED POSTPROCEDURAL STATES: ICD-10-CM

## 2024-10-08 PROCEDURE — 99204 OFFICE O/P NEW MOD 45 MIN: CPT

## 2024-10-08 RX ORDER — FAMOTIDINE 20 MG/1
20 TABLET, FILM COATED ORAL TWICE DAILY
Qty: 60 | Refills: 6 | Status: ACTIVE | COMMUNITY
Start: 2024-10-08 | End: 1900-01-01

## 2024-10-28 ENCOUNTER — APPOINTMENT (OUTPATIENT)
Dept: GASTROENTEROLOGY | Facility: CLINIC | Age: 64
End: 2024-10-28

## 2024-11-04 ENCOUNTER — APPOINTMENT (OUTPATIENT)
Dept: GASTROENTEROLOGY | Facility: AMBULATORY MEDICAL SERVICES | Age: 64
End: 2024-11-04
Payer: MEDICARE

## 2024-11-04 PROCEDURE — 43239 EGD BIOPSY SINGLE/MULTIPLE: CPT

## 2024-12-11 ENCOUNTER — RX RENEWAL (OUTPATIENT)
Age: 64
End: 2024-12-11

## 2024-12-13 ENCOUNTER — APPOINTMENT (OUTPATIENT)
Dept: GASTROENTEROLOGY | Facility: CLINIC | Age: 64
End: 2024-12-13
Payer: MEDICARE

## 2024-12-13 VITALS
TEMPERATURE: 97 F | SYSTOLIC BLOOD PRESSURE: 96 MMHG | HEIGHT: 61 IN | HEART RATE: 74 BPM | BODY MASS INDEX: 19.45 KG/M2 | WEIGHT: 103 LBS | OXYGEN SATURATION: 99 % | RESPIRATION RATE: 16 BRPM | DIASTOLIC BLOOD PRESSURE: 63 MMHG

## 2024-12-13 DIAGNOSIS — R07.0 PAIN IN THROAT: ICD-10-CM

## 2024-12-13 DIAGNOSIS — R09.82 POSTNASAL DRIP: ICD-10-CM

## 2024-12-13 PROCEDURE — 99214 OFFICE O/P EST MOD 30 MIN: CPT

## 2025-01-21 ENCOUNTER — EMERGENCY (EMERGENCY)
Facility: HOSPITAL | Age: 65
LOS: 1 days | Discharge: HOME | End: 2025-01-21
Attending: EMERGENCY MEDICINE | Admitting: EMERGENCY MEDICINE
Payer: MEDICARE

## 2025-01-21 VITALS
TEMPERATURE: 98 F | OXYGEN SATURATION: 100 % | RESPIRATION RATE: 17 BRPM | HEART RATE: 88 BPM | SYSTOLIC BLOOD PRESSURE: 103 MMHG | DIASTOLIC BLOOD PRESSURE: 75 MMHG

## 2025-01-21 VITALS
SYSTOLIC BLOOD PRESSURE: 111 MMHG | WEIGHT: 102.96 LBS | RESPIRATION RATE: 19 BRPM | HEIGHT: 61 IN | DIASTOLIC BLOOD PRESSURE: 74 MMHG | TEMPERATURE: 98 F | OXYGEN SATURATION: 98 % | HEART RATE: 89 BPM

## 2025-01-21 LAB
ALBUMIN SERPL ELPH-MCNC: 4 G/DL — SIGNIFICANT CHANGE UP (ref 3.3–5)
ALP SERPL-CCNC: 66 U/L — SIGNIFICANT CHANGE UP (ref 40–120)
ALT FLD-CCNC: 26 U/L — SIGNIFICANT CHANGE UP (ref 10–45)
ANION GAP SERPL CALC-SCNC: 14 MMOL/L — SIGNIFICANT CHANGE UP (ref 5–17)
APPEARANCE UR: CLEAR — SIGNIFICANT CHANGE UP
AST SERPL-CCNC: 19 U/L — SIGNIFICANT CHANGE UP (ref 10–40)
BACTERIA # UR AUTO: NEGATIVE /HPF — SIGNIFICANT CHANGE UP
BASOPHILS # BLD AUTO: 0.03 K/UL — SIGNIFICANT CHANGE UP (ref 0–0.2)
BASOPHILS NFR BLD AUTO: 0.3 % — SIGNIFICANT CHANGE UP (ref 0–2)
BILIRUB SERPL-MCNC: 0.3 MG/DL — SIGNIFICANT CHANGE UP (ref 0.2–1.2)
BILIRUB UR-MCNC: NEGATIVE — SIGNIFICANT CHANGE UP
BUN SERPL-MCNC: 16 MG/DL — SIGNIFICANT CHANGE UP (ref 7–23)
CALCIUM SERPL-MCNC: 9.6 MG/DL — SIGNIFICANT CHANGE UP (ref 8.4–10.5)
CAST: 1 /LPF — SIGNIFICANT CHANGE UP (ref 0–4)
CHLORIDE SERPL-SCNC: 101 MMOL/L — SIGNIFICANT CHANGE UP (ref 96–108)
CO2 SERPL-SCNC: 21 MMOL/L — LOW (ref 22–31)
COLOR SPEC: YELLOW — SIGNIFICANT CHANGE UP
CREAT SERPL-MCNC: 0.47 MG/DL — LOW (ref 0.5–1.3)
DIFF PNL FLD: NEGATIVE — SIGNIFICANT CHANGE UP
EGFR: 106 ML/MIN/1.73M2 — SIGNIFICANT CHANGE UP
EOSINOPHIL # BLD AUTO: 0.04 K/UL — SIGNIFICANT CHANGE UP (ref 0–0.5)
EOSINOPHIL NFR BLD AUTO: 0.4 % — SIGNIFICANT CHANGE UP (ref 0–6)
GLUCOSE SERPL-MCNC: 120 MG/DL — HIGH (ref 70–99)
GLUCOSE UR QL: NEGATIVE MG/DL — SIGNIFICANT CHANGE UP
HCG SERPL-ACNC: <2 MIU/ML — SIGNIFICANT CHANGE UP
HCT VFR BLD CALC: 39.6 % — SIGNIFICANT CHANGE UP (ref 34.5–45)
HGB BLD-MCNC: 13.4 G/DL — SIGNIFICANT CHANGE UP (ref 11.5–15.5)
IMM GRANULOCYTES NFR BLD AUTO: 0.3 % — SIGNIFICANT CHANGE UP (ref 0–0.9)
KETONES UR-MCNC: ABNORMAL MG/DL
LEUKOCYTE ESTERASE UR-ACNC: ABNORMAL
LYMPHOCYTES # BLD AUTO: 1.45 K/UL — SIGNIFICANT CHANGE UP (ref 1–3.3)
LYMPHOCYTES # BLD AUTO: 15.1 % — SIGNIFICANT CHANGE UP (ref 13–44)
MCHC RBC-ENTMCNC: 28.3 PG — SIGNIFICANT CHANGE UP (ref 27–34)
MCHC RBC-ENTMCNC: 33.8 G/DL — SIGNIFICANT CHANGE UP (ref 32–36)
MCV RBC AUTO: 83.7 FL — SIGNIFICANT CHANGE UP (ref 80–100)
MONOCYTES # BLD AUTO: 0.58 K/UL — SIGNIFICANT CHANGE UP (ref 0–0.9)
MONOCYTES NFR BLD AUTO: 6 % — SIGNIFICANT CHANGE UP (ref 2–14)
NEUTROPHILS # BLD AUTO: 7.49 K/UL — HIGH (ref 1.8–7.4)
NEUTROPHILS NFR BLD AUTO: 77.9 % — HIGH (ref 43–77)
NITRITE UR-MCNC: NEGATIVE — SIGNIFICANT CHANGE UP
NRBC # BLD: 0 /100 WBCS — SIGNIFICANT CHANGE UP (ref 0–0)
PH UR: 7 — SIGNIFICANT CHANGE UP (ref 5–8)
PLATELET # BLD AUTO: 192 K/UL — SIGNIFICANT CHANGE UP (ref 150–400)
POTASSIUM SERPL-MCNC: 3.8 MMOL/L — SIGNIFICANT CHANGE UP (ref 3.5–5.3)
POTASSIUM SERPL-SCNC: 3.8 MMOL/L — SIGNIFICANT CHANGE UP (ref 3.5–5.3)
PROT SERPL-MCNC: 6.8 G/DL — SIGNIFICANT CHANGE UP (ref 6–8.3)
PROT UR-MCNC: NEGATIVE MG/DL — SIGNIFICANT CHANGE UP
RBC # BLD: 4.73 M/UL — SIGNIFICANT CHANGE UP (ref 3.8–5.2)
RBC # FLD: 12.2 % — SIGNIFICANT CHANGE UP (ref 10.3–14.5)
RBC CASTS # UR COMP ASSIST: 0 /HPF — SIGNIFICANT CHANGE UP (ref 0–4)
SODIUM SERPL-SCNC: 136 MMOL/L — SIGNIFICANT CHANGE UP (ref 135–145)
SP GR SPEC: 1.01 — SIGNIFICANT CHANGE UP (ref 1–1.03)
SQUAMOUS # UR AUTO: 0 /HPF — SIGNIFICANT CHANGE UP (ref 0–5)
TROPONIN T, HIGH SENSITIVITY RESULT: <6 NG/L — SIGNIFICANT CHANGE UP (ref 0–51)
UROBILINOGEN FLD QL: 0.2 MG/DL — SIGNIFICANT CHANGE UP (ref 0.2–1)
WBC # BLD: 9.62 K/UL — SIGNIFICANT CHANGE UP (ref 3.8–10.5)
WBC # FLD AUTO: 9.62 K/UL — SIGNIFICANT CHANGE UP (ref 3.8–10.5)
WBC UR QL: 19 /HPF — HIGH (ref 0–5)

## 2025-01-21 PROCEDURE — 36000 PLACE NEEDLE IN VEIN: CPT

## 2025-01-21 PROCEDURE — 81001 URINALYSIS AUTO W/SCOPE: CPT

## 2025-01-21 PROCEDURE — 93010 ELECTROCARDIOGRAM REPORT: CPT

## 2025-01-21 PROCEDURE — 99284 EMERGENCY DEPT VISIT MOD MDM: CPT | Mod: 25

## 2025-01-21 PROCEDURE — 84702 CHORIONIC GONADOTROPIN TEST: CPT

## 2025-01-21 PROCEDURE — 87086 URINE CULTURE/COLONY COUNT: CPT

## 2025-01-21 PROCEDURE — 80053 COMPREHEN METABOLIC PANEL: CPT

## 2025-01-21 PROCEDURE — 84484 ASSAY OF TROPONIN QUANT: CPT

## 2025-01-21 PROCEDURE — 82962 GLUCOSE BLOOD TEST: CPT

## 2025-01-21 PROCEDURE — 85025 COMPLETE CBC W/AUTO DIFF WBC: CPT

## 2025-01-21 PROCEDURE — 99285 EMERGENCY DEPT VISIT HI MDM: CPT | Mod: FS

## 2025-01-21 PROCEDURE — 93005 ELECTROCARDIOGRAM TRACING: CPT

## 2025-01-21 RX ORDER — MECLIZINE HYDROCHLORIDE 25 MG/1
12.5 TABLET ORAL ONCE
Refills: 0 | Status: DISCONTINUED | OUTPATIENT
Start: 2025-01-21 | End: 2025-01-21

## 2025-01-21 RX ORDER — ONDANSETRON 4 MG/1
4 TABLET ORAL ONCE
Refills: 0 | Status: DISCONTINUED | OUTPATIENT
Start: 2025-01-21 | End: 2025-01-21

## 2025-01-21 RX ORDER — SODIUM CHLORIDE 9 MG/ML
1000 INJECTION, SOLUTION INTRAMUSCULAR; INTRAVENOUS; SUBCUTANEOUS ONCE
Refills: 0 | Status: COMPLETED | OUTPATIENT
Start: 2025-01-21 | End: 2025-01-21

## 2025-01-21 RX ADMIN — SODIUM CHLORIDE 1000 MILLILITER(S): 9 INJECTION, SOLUTION INTRAMUSCULAR; INTRAVENOUS; SUBCUTANEOUS at 23:08

## 2025-01-21 NOTE — ED PROVIDER NOTE - OBJECTIVE STATEMENT
64-year-old female history of atrial fibrillation status post ablation, Behcet's disease, hypothyroidism presents after near syncopal episode.  Patient reports this evening about 630 was sitting in bed, had a "very negative thought "(concern that she may have cancer) and had sudden onset flushing, dizziness/room spinning sensation, nausea and vomiting 2 times. Denies LOC, recalls entire event. Denies fall/trauma. Symptoms resolved in several minutes.  Called EMS was given Zofran and route.  Reports history of vasovagal syncope while using the bathroom with 2 prior events similar to her symptoms tonight.  Denies symptoms occurring with exertion or sudden change in position.  Denies current or preceding palpitations, chest pain or shortness of breath.  Was treated for a UTI this week, has concerns if his not resolved with no specific symptoms but requests repeat urinalysis today

## 2025-01-21 NOTE — ED PROVIDER NOTE - NSFOLLOWUPINSTRUCTIONS_ED_ALL_ED_FT
Please follow up with your primary care doctor in 1-3 days.  *Bring all printed lab/test results to your appointment(s).*    Stay well hydrated with water and electrolyte replacement solutions such as Pedialyte or the adult equivalent.    Continue all home medications    Return for recurrent symptoms, chest pain, shortness of breath, palpitations, dizziness, multiple episodes of vomiting, numbness/weakness to one side of your body, speech/vision changes, or any other concerns.

## 2025-01-21 NOTE — ED PROVIDER NOTE - CLINICAL SUMMARY MEDICAL DECISION MAKING FREE TEXT BOX
attending Lili: 64yF h/o Afib s/p ablation, Behcet's disease, hypothyroidism presents after near syncopal episode tonight around 6:30 while sitting in bed and having a "negative thought". Episodes lasted several minutes, since resolved. Denies LOC. Exam as above. Will obtain ekg, place on tele, labs jacekal dmitri, trop, reassess

## 2025-01-21 NOTE — ED ADULT NURSE NOTE - OBJECTIVE STATEMENT
64YOF hx hypothyroidism/Afib s/p ablation 2021 BIBEMS from home c/o near syncope. Pt states around 630PM tonight after eating dinner she began feeling nausea, vertigo and lightheaded, felt that she was about to faint so laid herself on her bed, had 4 episodes of NBNB vomiting. On EMS arrival pt was given 4mg zofran, finger stick of 190. AOx4, breathing unlabored, pulses strong x4, L AC 18g placed by EMS, endorses nausea and dizziness, negative BEFAST, pupils PERRL. Repeat , VSS. Bedside telemetry NSR rate of 80.

## 2025-01-21 NOTE — ED PROVIDER NOTE - NEUROLOGICAL, MLM
Alert and oriented, right horizontal fatigable nystagmus, CN otherwise intact. no focal deficits, no motor or sensory deficits.

## 2025-01-21 NOTE — ED ADULT TRIAGE NOTE - WEIGHT IN LBS
Caller: Patient    Doctor: Dr Richard Titus    Reason for call: Patient calling asking to speak to surgery Scheduler      Call back#: [phone]

## 2025-01-21 NOTE — ED PROVIDER NOTE - PATIENT PORTAL LINK FT
You can access the FollowMyHealth Patient Portal offered by [facility] by registering at the following website: [email or web address]. By joining [facility]’s FollowMyHealth portal, you will also be able to view your health information using other applications (apps) compatible with our system.

## 2025-01-21 NOTE — ED PROVIDER NOTE - PROGRESS NOTE DETAILS
attending Lili: results reviewed at length with patient. Patient eager to be discharged. Will dc with copy of test results, instruction for close outpatient follow-up and strict return precautions

## 2025-01-21 NOTE — ED PROVIDER NOTE - AVIAN FLU SYMPTOMS
Condition:: dysplastic nevus
Please Describe Your Condition:: has a history of mildly dysplastic nevus on the right lateral lower back
No

## 2025-01-21 NOTE — ED ADULT NURSE NOTE - BIRTH SEX
Interval History: Patient was seen and evaluated. Still with some nausea- gagging with meds and food. No vomiting. Still w/diarrhea. Headache resolved. No abd pain, chest pain, sob, cough, vision changes.     Objective:     Vital Signs (Most Recent):  Temp: 97.9 °F (36.6 °C) (07/14/22 2016)  Pulse: 106 (07/14/22 2016)  Resp: 19 (07/14/22 2016)  BP: (!) 149/67 (07/14/22 2016)  SpO2: 95 % (07/14/22 2016)   Vital Signs (24h Range):  Temp:  [97.1 °F (36.2 °C)-98.4 °F (36.9 °C)] 97.9 °F (36.6 °C)  Pulse:  [] 106  Resp:  [16-19] 19  SpO2:  [94 %-96 %] 95 %  BP: (141-191)/(65-80) 149/67     Weight: 71.7 kg (158 lb 1.1 oz)  Body mass index is 30.87 kg/m².    Intake/Output Summary (Last 24 hours) at 7/14/2022 3142  Last data filed at 7/14/2022 1616  Gross per 24 hour   Intake 100 ml   Output --   Net 100 ml      Physical Exam  Gen: in NAD, appears stated age  Neuro: AAOx3, motor, sensory, and strength grossly intact BL  HEENT: NTNC, EOMI, PERRL, MMM  CVS: RRR, no m/r/g; S1/S2 auscultated with no S3 or S4; capillary refill < 2 sec  Resp: lungs CTAB, no w/r/r; no belabored breathing or accessory muscle use appreciated   Abd: BS+ in all 4 quadrants; NTND, soft to palpation; no organomegaly appreciated   Extrem: pulses full, equal, and regular over all 4 extremities; swelling of BL LE, no swelling of BL UE      Significant Labs: All pertinent labs within the past 24 hours have been reviewed.  Blood Culture: No results for input(s): LABBLOO in the last 48 hours.  CBC:   Recent Labs   Lab 07/13/22  0349 07/14/22  0448   WBC 5.28 3.81*   HGB 10.1* 11.4*   HCT 31.3* 34.9*   * 115*     CMP:   Recent Labs   Lab 07/13/22  0349 07/14/22  0448    136   K 3.9 3.3*   * 111*   CO2 19* 17*   * 183*   BUN 17 16   CREATININE 1.0 1.1   CALCIUM 8.1* 8.1*   PROT 4.8* 5.0*   ALBUMIN 2.6* 2.7*   BILITOT 0.6 0.6   ALKPHOS 43* 45*   AST 9* 14   ALT 8* 12   ANIONGAP 7* 8   EGFRNONAA >60.0 57.1*     Prealbumin: No  results for input(s): PREALBUMIN in the last 48 hours.  Troponin: No results for input(s): TROPONINI in the last 48 hours.  Urine Culture: No results for input(s): LABURIN in the last 48 hours.  Urine Studies: No results for input(s): COLORU, APPEARANCEUA, PHUR, SPECGRAV, PROTEINUA, GLUCUA, KETONESU, BILIRUBINUA, OCCULTUA, NITRITE, UROBILINOGEN, LEUKOCYTESUR, RBCUA, WBCUA, BACTERIA, SQUAMEPITHEL, HYALINECASTS in the last 48 hours.    Invalid input(s): BETITO    Significant Imaging: I have reviewed all pertinent imaging results/findings within the past 24 hours.   Female

## 2025-01-21 NOTE — ED PROVIDER NOTE - MUSCULOSKELETAL, MLM
PT orders received and acknowledged, chart review completed. Patient was unable to participate in skilled therapy at this time secondary to currently off the floor for CT scan.  Skilled therapy will attempt to see patient later in the day as schedules permits.    Spine appears normal, range of motion is not limited, no muscle or joint tenderness

## 2025-01-23 DIAGNOSIS — R11.0 NAUSEA: ICD-10-CM

## 2025-01-23 LAB
CULTURE RESULTS: SIGNIFICANT CHANGE UP
SPECIMEN SOURCE: SIGNIFICANT CHANGE UP

## 2025-01-23 RX ORDER — ONDANSETRON 4 MG/1
4 TABLET, ORALLY DISINTEGRATING ORAL
Qty: 30 | Refills: 3 | Status: ACTIVE | COMMUNITY
Start: 2025-01-23 | End: 1900-01-01

## 2025-02-13 ENCOUNTER — RX RENEWAL (OUTPATIENT)
Age: 65
End: 2025-02-13

## 2025-02-20 ENCOUNTER — APPOINTMENT (OUTPATIENT)
Dept: OBGYN | Facility: CLINIC | Age: 65
End: 2025-02-20
Payer: MEDICARE

## 2025-02-20 VITALS
BODY MASS INDEX: 18.88 KG/M2 | HEIGHT: 61 IN | WEIGHT: 100 LBS | HEART RATE: 87 BPM | SYSTOLIC BLOOD PRESSURE: 109 MMHG | DIASTOLIC BLOOD PRESSURE: 77 MMHG

## 2025-02-20 DIAGNOSIS — Z78.9 OTHER SPECIFIED HEALTH STATUS: ICD-10-CM

## 2025-02-20 DIAGNOSIS — Z01.419 ENCOUNTER FOR GYNECOLOGICAL EXAMINATION (GENERAL) (ROUTINE) W/OUT ABNORMAL FINDINGS: ICD-10-CM

## 2025-02-20 DIAGNOSIS — Z12.39 ENCOUNTER FOR OTHER SCREENING FOR MALIGNANT NEOPLASM OF BREAST: ICD-10-CM

## 2025-02-20 PROCEDURE — G0101: CPT

## 2025-02-22 LAB — HPV HIGH+LOW RISK DNA PNL CVX: NOT DETECTED

## 2025-02-23 LAB — CYTOLOGY CVX/VAG DOC THIN PREP: ABNORMAL

## 2025-05-09 ENCOUNTER — RX RENEWAL (OUTPATIENT)
Age: 65
End: 2025-05-09

## 2025-05-09 RX ORDER — METOPROLOL SUCCINATE 25 MG/1
25 TABLET, EXTENDED RELEASE ORAL
Qty: 30 | Refills: 0 | Status: ACTIVE | COMMUNITY
Start: 2025-05-09 | End: 1900-01-01

## 2025-05-12 ENCOUNTER — NON-APPOINTMENT (OUTPATIENT)
Age: 65
End: 2025-05-12

## 2025-05-12 ENCOUNTER — APPOINTMENT (OUTPATIENT)
Dept: CARDIOLOGY | Facility: CLINIC | Age: 65
End: 2025-05-12
Payer: MEDICARE

## 2025-05-12 VITALS
HEIGHT: 61 IN | DIASTOLIC BLOOD PRESSURE: 60 MMHG | SYSTOLIC BLOOD PRESSURE: 98 MMHG | OXYGEN SATURATION: 98 % | HEART RATE: 78 BPM | BODY MASS INDEX: 19.07 KG/M2 | WEIGHT: 101 LBS

## 2025-05-12 DIAGNOSIS — M35.2 BEHCET'S DISEASE: ICD-10-CM

## 2025-05-12 DIAGNOSIS — F41.9 ANXIETY DISORDER, UNSPECIFIED: ICD-10-CM

## 2025-05-12 DIAGNOSIS — Z86.79 OTHER SPECIFIED POSTPROCEDURAL STATES: ICD-10-CM

## 2025-05-12 DIAGNOSIS — E03.9 HYPOTHYROIDISM, UNSPECIFIED: ICD-10-CM

## 2025-05-12 DIAGNOSIS — Z98.890 OTHER SPECIFIED POSTPROCEDURAL STATES: ICD-10-CM

## 2025-05-12 PROCEDURE — G2211 COMPLEX E/M VISIT ADD ON: CPT

## 2025-05-12 PROCEDURE — 93000 ELECTROCARDIOGRAM COMPLETE: CPT

## 2025-05-12 PROCEDURE — 99204 OFFICE O/P NEW MOD 45 MIN: CPT

## 2025-06-24 NOTE — ED PROVIDER NOTE - CRITICAL CARE ATTENDING CONTRIBUTION TO CARE
Order for CT chest patient would like to have this done Monday -Thursday any time   
I have personally seen and examined this patient. I have fully participated in the care of this patient. I have made amendments to the documentation where appropriate and otherwise agree with the history, physical exam, and plan as documented by the ACP.       60 F w/ hx of bechets disease, and hypothyroidism presents to the ER w/ palpitations, pt w/ hx of afib s/p covid diagnosis, pt states she was previously on eliquis. Pt states that she was taken off of medications. She has no cp, no sob, no nausea no vomiting. Pt reports her levothyroxine dosage was reduced over the past 2 weeks. Pt states she went to see her PCP today due to palpitations, and mild nausea. She has no fevers, no chills, no sob. She drinks alcohol daily. She is nontoxic appearing. On exam,   Const: appearing stated age, mild distress, appears anxious Eyes: no conjunctival injection and no scleral icterus ENMT: Atraumatic external nose and ears, Moist mucus membranes Neck: Symmetric, trachea midline, no c spine tenderness BACK: no bruising, no midline t/l spine tenderness CVS: +S1/S2, tachycardia radial pulse 2+ bilaterally RESP: Unlabored respiratory effort, Clear to auscultation bilaterally  GI: Nontender/Nondistended, soft abdomen MSK: Extremities w/o deformity or ttp  Neuro: GCS=15, CNs II-XII grossly intact,  Sensation grossly intact  Psych: Awake, Alert, & Orientedx3;  Appropriate mood and affect, cooperative  Findings concerning for primary afib vs secondary will obtain labs, dimer and reassess. HR in the 130-150s, pt w/ no active cp. Will give hydration.    s/p diltizem pt w/ pause up to 6 seconds, cardiology consulted and EP called,

## 2025-07-07 NOTE — ED PROVIDER NOTE - HIV OFFER
Patient's  called to request a refill of doxycycline for the patient. The current active order for the prescription does have refills available, but has an Rx expiration date of 5/13/25.   Previously Declined (within the last year)

## 2025-08-16 ENCOUNTER — NON-APPOINTMENT (OUTPATIENT)
Age: 65
End: 2025-08-16

## 2025-08-18 ENCOUNTER — APPOINTMENT (OUTPATIENT)
Dept: INTERNAL MEDICINE | Facility: CLINIC | Age: 65
End: 2025-08-18
Payer: MEDICARE

## 2025-08-18 ENCOUNTER — LABORATORY RESULT (OUTPATIENT)
Age: 65
End: 2025-08-18

## 2025-08-18 ENCOUNTER — RX RENEWAL (OUTPATIENT)
Age: 65
End: 2025-08-18

## 2025-08-18 VITALS — DIASTOLIC BLOOD PRESSURE: 80 MMHG | SYSTOLIC BLOOD PRESSURE: 124 MMHG

## 2025-08-18 VITALS — WEIGHT: 99 LBS | BODY MASS INDEX: 18.69 KG/M2 | HEIGHT: 61 IN

## 2025-08-18 DIAGNOSIS — B34.9 VIRAL INFECTION, UNSPECIFIED: ICD-10-CM

## 2025-08-18 DIAGNOSIS — R59.0 LOCALIZED ENLARGED LYMPH NODES: ICD-10-CM

## 2025-08-18 PROCEDURE — 36415 COLL VENOUS BLD VENIPUNCTURE: CPT

## 2025-08-18 PROCEDURE — 99203 OFFICE O/P NEW LOW 30 MIN: CPT

## 2025-08-18 PROCEDURE — G2211 COMPLEX E/M VISIT ADD ON: CPT

## 2025-08-18 RX ORDER — FAMOTIDINE 40 MG/1
40 TABLET, FILM COATED ORAL
Qty: 90 | Refills: 3 | Status: ACTIVE | COMMUNITY
Start: 2025-08-18 | End: 1900-01-01

## 2025-08-19 ENCOUNTER — TRANSCRIPTION ENCOUNTER (OUTPATIENT)
Age: 65
End: 2025-08-19

## 2025-08-20 LAB
ALBUMIN SERPL ELPH-MCNC: 4.3 G/DL
ALP BLD-CCNC: 62 U/L
ALT SERPL-CCNC: 21 U/L
ANION GAP SERPL CALC-SCNC: 15 MMOL/L
AST SERPL-CCNC: 23 U/L
BASOPHILS # BLD AUTO: 0.05 K/UL
BASOPHILS NFR BLD AUTO: 0.6 %
BILIRUB SERPL-MCNC: 0.2 MG/DL
BUN SERPL-MCNC: 14 MG/DL
CALCIUM SERPL-MCNC: 9.6 MG/DL
CHLORIDE SERPL-SCNC: 105 MMOL/L
CHOLEST SERPL-MCNC: 186 MG/DL
CO2 SERPL-SCNC: 23 MMOL/L
CREAT SERPL-MCNC: 0.78 MG/DL
EGFRCR SERPLBLD CKD-EPI 2021: 84 ML/MIN/1.73M2
EOSINOPHIL # BLD AUTO: 0.12 K/UL
EOSINOPHIL NFR BLD AUTO: 1.4 %
ESTIMATED AVERAGE GLUCOSE: 105 MG/DL
GLUCOSE SERPL-MCNC: 106 MG/DL
HBA1C MFR BLD HPLC: 5.3 %
HCT VFR BLD CALC: 42.7 %
HDLC SERPL-MCNC: 50 MG/DL
HGB BLD-MCNC: 13.9 G/DL
IMM GRANULOCYTES NFR BLD AUTO: 0.2 %
INFLUENZA A RESULT: NOT DETECTED
INFLUENZA B RESULT: NOT DETECTED
LDLC SERPL-MCNC: 116 MG/DL
LYMPHOCYTES # BLD AUTO: 3.07 K/UL
LYMPHOCYTES NFR BLD AUTO: 35 %
MAN DIFF?: NORMAL
MCHC RBC-ENTMCNC: 29.6 PG
MCHC RBC-ENTMCNC: 32.6 G/DL
MCV RBC AUTO: 90.9 FL
MONOCYTES # BLD AUTO: 0.75 K/UL
MONOCYTES NFR BLD AUTO: 8.6 %
NEUTROPHILS # BLD AUTO: 4.75 K/UL
NEUTROPHILS NFR BLD AUTO: 54.2 %
NONHDLC SERPL-MCNC: 136 MG/DL
PLATELET # BLD AUTO: 224 K/UL
POTASSIUM SERPL-SCNC: 4.6 MMOL/L
PROT SERPL-MCNC: 6.7 G/DL
RBC # BLD: 4.7 M/UL
RBC # FLD: 12.8 %
RESP SYN VIRUS RESULT: NOT DETECTED
SARS-COV-2 RESULT: NOT DETECTED
SODIUM SERPL-SCNC: 142 MMOL/L
T3RU NFR SERPL: 1.1 TBI
T4 SERPL-MCNC: 5.9 UG/DL
TRIGL SERPL-MCNC: 112 MG/DL
TSH SERPL-ACNC: <0.01 UIU/ML
URATE SERPL-MCNC: 4.8 MG/DL
WBC # FLD AUTO: 8.76 K/UL